# Patient Record
Sex: FEMALE | Race: WHITE | NOT HISPANIC OR LATINO
[De-identification: names, ages, dates, MRNs, and addresses within clinical notes are randomized per-mention and may not be internally consistent; named-entity substitution may affect disease eponyms.]

---

## 2021-10-04 ENCOUNTER — APPOINTMENT (OUTPATIENT)
Dept: INTERNAL MEDICINE | Facility: CLINIC | Age: 31
End: 2021-10-04
Payer: COMMERCIAL

## 2021-10-04 ENCOUNTER — APPOINTMENT (OUTPATIENT)
Dept: INTERNAL MEDICINE | Facility: CLINIC | Age: 31
End: 2021-10-04

## 2021-10-04 ENCOUNTER — TRANSCRIPTION ENCOUNTER (OUTPATIENT)
Age: 31
End: 2021-10-04

## 2021-10-04 VITALS
BODY MASS INDEX: 24.48 KG/M2 | TEMPERATURE: 97.8 F | SYSTOLIC BLOOD PRESSURE: 110 MMHG | WEIGHT: 156 LBS | DIASTOLIC BLOOD PRESSURE: 70 MMHG | HEIGHT: 67 IN

## 2021-10-04 PROBLEM — Z00.00 ENCOUNTER FOR PREVENTIVE HEALTH EXAMINATION: Status: ACTIVE | Noted: 2021-10-04

## 2021-10-04 PROCEDURE — 36415 COLL VENOUS BLD VENIPUNCTURE: CPT

## 2021-10-04 PROCEDURE — 99204 OFFICE O/P NEW MOD 45 MIN: CPT | Mod: 25

## 2021-10-04 RX ORDER — LEVOTHYROXINE SODIUM 75 UG/1
75 TABLET ORAL
Refills: 0 | Status: ACTIVE | COMMUNITY

## 2021-10-04 NOTE — ASSESSMENT
[FreeTextEntry1] : It appears patient has had 2 episodes of preseptal cellulitis based on ophthalmology evaluation.  Cannot exclude localized angioedema or allergic reaction but I am relying on ophthalmology evaluation.  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  Recurrent infections should not occur unless there is underlying sinusitis that needs to be treated\par The current time of evaluation is unremarkable\par All issues regarding patient's health and medical problems have been discussed. The patient understands and concurs with the treatment plan.\par This was an extended visit lasting 45minutes, including review of prior notes laboratory data and examination and discussing with patient including completion of current note.\par \par  [Treatment Education] : treatment education [Treatment Adherence] : treatment adherence [Rx Dose / Side Effects] : Rx dose/side effects [Risk Reduction] : risk reduction [Drug Interactions / Side Effects] : drug interactions/side effects [Disclosure of Diagnosis] : disclosure of diagnosis [Anticipatory Guidance] : anticipatory guidance

## 2021-10-04 NOTE — REVIEW OF SYSTEMS
[Eye Pain] : eye pain [Eyesight Problems] : eyesight problems [Fever] : no fever [Chills] : no chills [As Noted in HPI] : as noted in HPI [Nasal Discharge] : nasal discharge [Negative] : Neurological [FreeTextEntry3] : pressure behind right eye

## 2021-10-04 NOTE — HISTORY OF PRESENT ILLNESS
[FreeTextEntry1] : \par swelling above eye\par Patient is a 31-year-old woman who is referred here for evaluation of recurrent preseptal cellulitis of the right eye.  Patient has essentially normal health except for issues with chronic migraines.  She is 8 months postpartum without issues.  She has a past medical history of hypothyroidism on medication.  Proximally 2 weeks ago patient had an episode of swelling of her toes of her right eye with discomfort diagnosed by ophthalmology as preseptal cellulitis treated with doxycycline with good response.  Patient was off antibiotics for a week and had a typical migraine but lost peripheral vision of the right eye that lasted for hours and resolved\par Shortly after patient had a recurrent bout of preseptal cellulitis treated with doxycycline with resolution.  Patient also complains of postnasal drip and fullness retro-orbital he and is due to have a CAT scan.  She comes today for evaluation of any recurrent or residual cellulitis\par .  Of note patient is on birth control pills while having migraines with aura [de-identified] :

## 2021-10-04 NOTE — PHYSICAL EXAM
[General Appearance - Alert] : alert [General Appearance - In No Acute Distress] : in no acute distress [Sclera] : the sclera and conjunctiva were normal [Extraocular Movements] : extraocular movements were intact [FreeTextEntry1] : Examination of right eye reveals no evidence of swelling cellulitis or chronic chalazions.  Left eye examination unremarkable [Outer Ear] : the ears and nose were normal in appearance [Oropharynx] : the oropharynx was normal with no thrush [Neck Appearance] : the appearance of the neck was normal [Neck Cervical Mass (___cm)] : no neck mass was observed [Jugular Venous Distention Increased] : there was no jugular-venous distention [Thyroid Diffuse Enlargement] : the thyroid was not enlarged [Musculoskeletal - Swelling] : no joint swelling [Nail Clubbing] : no clubbing  or cyanosis of the fingernails [Motor Tone] : muscle strength and tone were normal [Skin Color & Pigmentation] : normal skin color and pigmentation [] : no rash [Cranial Nerves] : cranial nerves 2-12 were intact

## 2021-10-05 LAB — EOSINOPHIL # BLD MANUAL: 40 /UL

## 2021-10-06 LAB — IGE SER-MCNC: 3 KU/L

## 2021-10-07 LAB — ACE BLD-CCNC: 32 U/L

## 2021-10-11 ENCOUNTER — NON-APPOINTMENT (OUTPATIENT)
Age: 31
End: 2021-10-11

## 2021-11-05 ENCOUNTER — APPOINTMENT (OUTPATIENT)
Dept: INTERNAL MEDICINE | Facility: CLINIC | Age: 31
End: 2021-11-05
Payer: COMMERCIAL

## 2021-11-05 VITALS
BODY MASS INDEX: 24.72 KG/M2 | SYSTOLIC BLOOD PRESSURE: 108 MMHG | DIASTOLIC BLOOD PRESSURE: 75 MMHG | WEIGHT: 157.5 LBS | HEIGHT: 67 IN | TEMPERATURE: 97.1 F | HEART RATE: 78 BPM

## 2021-11-05 DIAGNOSIS — E78.9 DISORDER OF LIPOPROTEIN METABOLISM, UNSPECIFIED: ICD-10-CM

## 2021-11-05 PROCEDURE — G0008: CPT

## 2021-11-05 PROCEDURE — 99385 PREV VISIT NEW AGE 18-39: CPT | Mod: 25

## 2021-11-05 PROCEDURE — 90686 IIV4 VACC NO PRSV 0.5 ML IM: CPT

## 2021-11-05 PROCEDURE — 36415 COLL VENOUS BLD VENIPUNCTURE: CPT

## 2021-11-05 RX ORDER — NORETHINDRONE ACETATE AND ETHINYL ESTRADIOL AND FERROUS FUMARATE 1MG-20(24)
KIT ORAL
Refills: 0 | Status: DISCONTINUED | COMMUNITY
End: 2021-11-05

## 2021-11-05 NOTE — HISTORY OF PRESENT ILLNESS
[FreeTextEntry1] : Physical exam.  [de-identified] : Ms. MALINDA NAVARRO is a 31 year female comes to the office for physical exam. Patient denies fever, cough SOB. No other complaints at this time.

## 2021-11-05 NOTE — HEALTH RISK ASSESSMENT
[Yes] : Yes [Monthly or less (1 pt)] : Monthly or less (1 point) [1 or 2 (0 pts)] : 1 or 2 (0 points) [Never (0 pts)] : Never (0 points) [No] : In the past 12 months have you used drugs other than those required for medical reasons? No [0] : 2) Feeling down, depressed, or hopeless: Not at all (0) [PHQ-2 Negative - No further assessment needed] : PHQ-2 Negative - No further assessment needed [Patient reported PAP Smear was normal] : Patient reported PAP Smear was normal [HIV test declined] : HIV test declined [Hepatitis C test declined] : Hepatitis C test declined [] : No [de-identified] : OB/GYN- Dr. Lara  [Audit-CScore] : 1 [SCK2Zgdmt] : 0 [PapSmearDate] : 06/21

## 2021-11-05 NOTE — PLAN
[FreeTextEntry1] : In regards to patients Physical exam, routine blood work drawn, will review results with patient.\par \par Patient received flu vaccine today. Patient advised of adverse effects of medication including but not limited to muscle soreness at site of injection and general malaise \par \par Counseling included abnormal lab results, differential diagnoses, treatment options, risks and benefits, lifestyle changes, current condition, medications, and dose adjustments. \par The patient was interactive, attentive, asked questions, and verbalized understanding

## 2021-11-08 LAB
25(OH)D3 SERPL-MCNC: 30.7 NG/ML
ALBUMIN SERPL ELPH-MCNC: 4.7 G/DL
ALP BLD-CCNC: 68 U/L
ALT SERPL-CCNC: 11 U/L
ANION GAP SERPL CALC-SCNC: 13 MMOL/L
AST SERPL-CCNC: 16 U/L
BASOPHILS # BLD AUTO: 0.04 K/UL
BASOPHILS NFR BLD AUTO: 0.9 %
BILIRUB SERPL-MCNC: 0.4 MG/DL
BUN SERPL-MCNC: 10 MG/DL
CALCIUM SERPL-MCNC: 9.6 MG/DL
CHLORIDE SERPL-SCNC: 107 MMOL/L
CHOLEST SERPL-MCNC: 176 MG/DL
CO2 SERPL-SCNC: 21 MMOL/L
CREAT SERPL-MCNC: 0.62 MG/DL
EOSINOPHIL # BLD AUTO: 0.06 K/UL
EOSINOPHIL NFR BLD AUTO: 1.4 %
ESTIMATED AVERAGE GLUCOSE: 105 MG/DL
GLUCOSE SERPL-MCNC: 83 MG/DL
HBA1C MFR BLD HPLC: 5.3 %
HCT VFR BLD CALC: 46.1 %
HDLC SERPL-MCNC: 75 MG/DL
HGB BLD-MCNC: 14.6 G/DL
IMM GRANULOCYTES NFR BLD AUTO: 0.2 %
LDLC SERPL CALC-MCNC: 85 MG/DL
LYMPHOCYTES # BLD AUTO: 1.31 K/UL
LYMPHOCYTES NFR BLD AUTO: 29.9 %
MAN DIFF?: NORMAL
MCHC RBC-ENTMCNC: 30.5 PG
MCHC RBC-ENTMCNC: 31.7 GM/DL
MCV RBC AUTO: 96.4 FL
MONOCYTES # BLD AUTO: 0.38 K/UL
MONOCYTES NFR BLD AUTO: 8.7 %
NEUTROPHILS # BLD AUTO: 2.58 K/UL
NEUTROPHILS NFR BLD AUTO: 58.9 %
NONHDLC SERPL-MCNC: 100 MG/DL
PLATELET # BLD AUTO: 291 K/UL
POTASSIUM SERPL-SCNC: 4.9 MMOL/L
PROT SERPL-MCNC: 7.3 G/DL
RBC # BLD: 4.78 M/UL
RBC # FLD: 12 %
SODIUM SERPL-SCNC: 141 MMOL/L
TRIGL SERPL-MCNC: 74 MG/DL
TSH SERPL-ACNC: 1.86 UIU/ML
WBC # FLD AUTO: 4.38 K/UL

## 2022-01-19 LAB
APPEARANCE: CLEAR
BILIRUBIN URINE: NEGATIVE
BLOOD URINE: NEGATIVE
COLOR: NORMAL
GLUCOSE QUALITATIVE U: NEGATIVE
KETONES URINE: NEGATIVE
LEUKOCYTE ESTERASE URINE: NEGATIVE
NITRITE URINE: NEGATIVE
PH URINE: 5.5
PROTEIN URINE: NEGATIVE
SPECIFIC GRAVITY URINE: 1.01
UROBILINOGEN URINE: NORMAL

## 2022-02-14 ENCOUNTER — APPOINTMENT (OUTPATIENT)
Dept: PAIN MANAGEMENT | Facility: CLINIC | Age: 32
End: 2022-02-14

## 2022-02-23 ENCOUNTER — APPOINTMENT (OUTPATIENT)
Dept: GYNECOLOGIC ONCOLOGY | Facility: CLINIC | Age: 32
End: 2022-02-23
Payer: COMMERCIAL

## 2022-02-23 VITALS
HEIGHT: 67 IN | RESPIRATION RATE: 16 BRPM | DIASTOLIC BLOOD PRESSURE: 93 MMHG | WEIGHT: 157 LBS | BODY MASS INDEX: 24.64 KG/M2 | OXYGEN SATURATION: 97 % | SYSTOLIC BLOOD PRESSURE: 142 MMHG | HEART RATE: 84 BPM

## 2022-02-23 DIAGNOSIS — B97.7 PAPILLOMAVIRUS AS THE CAUSE OF DISEASES CLASSIFIED ELSEWHERE: ICD-10-CM

## 2022-02-23 PROCEDURE — 57455 BIOPSY OF CERVIX W/SCOPE: CPT | Mod: 59

## 2022-02-23 PROCEDURE — 99204 OFFICE O/P NEW MOD 45 MIN: CPT | Mod: 25

## 2022-02-28 NOTE — PROCEDURE
[Colposcopy] : colposcopy [Abnormal Pap] : an abnormal pap smear [HGSIL] : HGSIL [HPV high risk] : PCR positive for high risk HPV [Patient] : the patient [Verbal Consent] : verbal consent was obtained prior to the procedure and is detailed in the patient's record [Silver Nitrate] : silver nitrate [No Complications] : none [Tolerated Well] : the patient tolerated the procedure well [Punctation ___ o'clock] : no punctation [Mosaicism ___ o'clock] : no mosaicism [FreeTextEntry2] : mild changes noted at 7 and 11 oclock, no signs of malignancy [FreeTextEntry1] : Cervical Colposcopy performed today

## 2022-02-28 NOTE — PHYSICAL EXAM
[Normal] : Bimanual Exam: Normal [de-identified] : Patient was interviewed and examined with chaperone present. Name of Chaperone: Aminata Houser PA-C

## 2022-02-28 NOTE — END OF VISIT
[FreeTextEntry3] : Written by Ruth Houser PA-C, acting as a scribe for Dr. Juancho Baron.\par  [FreeTextEntry2] : This note accurately reflects the work and decisions made by me.\par

## 2022-02-28 NOTE — HISTORY OF PRESENT ILLNESS
[FreeTextEntry1] : 32yo  LMP 2/15/22 presents today with referral from Dr. Lara for abnormal Cervical PAP. 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 She reports never having an abnormal Cervical PAP prior to this. She has not yet undergone colposcopy. She denies smoking or HIV history and is happily  to her . She reports being fully vaccinated with Gardisal. \par \par \par \par

## 2022-02-28 NOTE — CHIEF COMPLAINT
[FreeTextEntry1] : [facility]\par \par [facility] [phone] at [address] [place] [id number]\par

## 2022-03-03 ENCOUNTER — NON-APPOINTMENT (OUTPATIENT)
Age: 32
End: 2022-03-03

## 2022-03-14 ENCOUNTER — APPOINTMENT (OUTPATIENT)
Dept: GYNECOLOGIC ONCOLOGY | Facility: CLINIC | Age: 32
End: 2022-03-14
Payer: COMMERCIAL

## 2022-03-14 LAB — CORE LAB BIOPSY: NORMAL

## 2022-03-14 PROCEDURE — 99212 OFFICE O/P EST SF 10 MIN: CPT | Mod: 95

## 2022-03-15 NOTE — REASON FOR VISIT
[Spouse] : spouse [FreeTextEntry1] : -Recently referred by Dr. Lara for abnormal cpap (HSIL, HPV HR positive). \par -Pt reported never having abnormal pap smear. \par -Reported that she is fully vaccinated with Gardasil. \par -Colposcopy performed at consultation. \par -TEB today to review results.

## 2022-03-15 NOTE — END OF VISIT
[Time Spent: ___ minutes] : I have spent [unfilled] minutes of time on the encounter. [FreeTextEntry3] : Written by Talia Dupree, acting as a scribe for Dr. Juancho Baron \par This note accurately reflects the work and decisions made by me.

## 2022-03-15 NOTE — ASSESSMENT
[FreeTextEntry1] : Discussed with pt and her  that 2/3 biopsies were normal. I explained that 1 of her biopsies revealed a low grade dysplasia which no intervention is recommended at this time. I am recommending patient follow up in my office in 6 months for a repeat pap smear. Patient stated she was relieved and agreed to comply.

## 2022-03-15 NOTE — HISTORY OF PRESENT ILLNESS
[FreeTextEntry1] : This 30 y/o was recently referred to my office by Dr. Lara for abnormal cervical pap smear from 2/10/22 revealing HSIL, HPV HR positive. Patient reported never having an abnormal pap smear and reported being fully vaccinated with Gardisil. I performed a colposcopy at the time of her consultation. Mild changes were noted at 7, and 11 o'clock, biopsies of these areas were performed as well as an ECC. She has a telehealth today to discuss results. \par \par Final Diagnosis\par \par 1. Endocervix, curettage\par - Endocervical tissue with metaplastic and reactive change\par - Superficial squamous epithelium\par \par 2. Cervix, 7 oclock, biopsy\par - Transformation zone mucosa with focal low grade squamous\par  intraepithelial lesion  (LSIL, BONY 1) in a background of reactive change\par \par 3. Cervix, 11 oclock, biopsy\par - Transformation zone mucosa with squamous intraepithelial lesion in\par  a background of reactive change\par

## 2022-07-15 ENCOUNTER — NON-APPOINTMENT (OUTPATIENT)
Age: 32
End: 2022-07-15

## 2022-08-11 ENCOUNTER — NON-APPOINTMENT (OUTPATIENT)
Age: 32
End: 2022-08-11

## 2022-09-06 ENCOUNTER — APPOINTMENT (OUTPATIENT)
Dept: OBGYN | Facility: CLINIC | Age: 32
End: 2022-09-06

## 2022-09-07 ENCOUNTER — APPOINTMENT (OUTPATIENT)
Dept: GYNECOLOGIC ONCOLOGY | Facility: CLINIC | Age: 32
End: 2022-09-07

## 2022-09-07 ENCOUNTER — LABORATORY RESULT (OUTPATIENT)
Age: 32
End: 2022-09-07

## 2022-09-07 VITALS
RESPIRATION RATE: 16 BRPM | OXYGEN SATURATION: 100 % | SYSTOLIC BLOOD PRESSURE: 129 MMHG | DIASTOLIC BLOOD PRESSURE: 83 MMHG | HEART RATE: 108 BPM | WEIGHT: 155 LBS | BODY MASS INDEX: 24.33 KG/M2 | HEIGHT: 67 IN

## 2022-09-07 PROCEDURE — 99213 OFFICE O/P EST LOW 20 MIN: CPT

## 2022-09-07 NOTE — END OF VISIT
[FreeTextEntry3] : Follow up on PAP results and as needed\par Routine gynecologic follow up [FreeTextEntry2] : Talia Dupree MA was present the entire duration of the patient interaction and gynecological exam.\par

## 2022-09-07 NOTE — HISTORY OF PRESENT ILLNESS
[FreeTextEntry1] : This 33 y/o previous patient of Dr. Juancho Baron with a hx of abnormal pap smear from 2/10/22 revealing HSIL, HPV HR positive. Patient had reported never having an abnormal pap smear and reported feeling fully vaccinated with Gardisil . Dr. Baron performed a colposcopy, mild changes were noted at 9, and 11 oclock, biopsies of these areas were performed as well as an ECC. \par \par Dr. Baron discussed with patient that 2/3 biopsies were normal and explained that 1 of her biopsies revealed low grade dysplasia which no intervention was recommended at the time. Patient was advised to follow up in 6 months for a pap smear. She reports no new gynecologic concerns. No poist-coital bleeding.

## 2022-09-07 NOTE — REASON FOR VISIT
[FreeTextEntry1] : [place] [facility] \par \par [facility] [place]. [phone]\par [address] [place] [address] \par \par follow up

## 2022-09-07 NOTE — ASSESSMENT
[FreeTextEntry1] : Pt is a 33 yo with history of LSIL PAP and biopsy showing CIN1. She presents for follow up PAP. She has no new gynecologic concerns. \par \par We discussed consideration of AHCC supplementation as it has been shown to reduce risk of HPV infection by 60% in one study. She has received the Gardacil vaccine. We will follow up on the PAP results.

## 2022-09-07 NOTE — PHYSICAL EXAM
[Chaperone Present] : A chaperone was present in the examining room during all aspects of the physical examination [FreeTextEntry1] : Talia Dupree MA was present the entire duration of the patient interaction and gynecological exam. [Normal] : Anus and perineum: Normal sphincter tone, no masses, no prolapse. [de-identified] : large ectropion, no friable tissue [de-identified] : non-tender pelvic floor muscles, uterus mobile small [Fully active, able to carry on all pre-disease performance without restriction] : Status 0 - Fully active, able to carry on all pre-disease performance without restriction

## 2022-09-14 ENCOUNTER — APPOINTMENT (OUTPATIENT)
Dept: GYNECOLOGIC ONCOLOGY | Facility: CLINIC | Age: 32
End: 2022-09-14

## 2022-09-16 LAB
CYTOLOGY CVX/VAG DOC THIN PREP: ABNORMAL
HPV HIGH+LOW RISK DNA PNL CVX: DETECTED

## 2022-09-18 ENCOUNTER — NON-APPOINTMENT (OUTPATIENT)
Age: 32
End: 2022-09-18

## 2022-09-29 ENCOUNTER — APPOINTMENT (OUTPATIENT)
Dept: GYNECOLOGIC ONCOLOGY | Facility: CLINIC | Age: 32
End: 2022-09-29

## 2022-09-29 VITALS
BODY MASS INDEX: 23.7 KG/M2 | HEART RATE: 69 BPM | SYSTOLIC BLOOD PRESSURE: 121 MMHG | DIASTOLIC BLOOD PRESSURE: 68 MMHG | HEIGHT: 67 IN | OXYGEN SATURATION: 99 % | WEIGHT: 151 LBS

## 2022-09-29 DIAGNOSIS — R85.611 ATYPICAL SQUAMOUS CELLS CANNOT EXCLUDE HIGH GRADE SQUAMOUS INTRAEPITHELIAL LESION ON CYTOLOGIC SMEAR OF ANUS (ASC-H): ICD-10-CM

## 2022-09-29 PROCEDURE — 57455 BIOPSY OF CERVIX W/SCOPE: CPT

## 2022-09-29 NOTE — ASSESSMENT
[FreeTextEntry1] : Pt is a 33 yo with ASC-H PAP, HR HPV positive (non 16,18, 45) s/p colposcopy. Cervical biopsy done at 6 and 9 o'clock.

## 2022-09-29 NOTE — REASON FOR VISIT
[FreeTextEntry1] : [place] Location \par \par [facility] [place]. [phone]\par [address] [place] [address]

## 2022-09-29 NOTE — PHYSICAL EXAM
[Chaperone Present] : A chaperone was present in the examining room during all aspects of the physical examination [FreeTextEntry1] : Talia Dupree MA was present the entire duration of the patient interaction and gynecological exam. [Abnormal] : Cervix: Abnormal [Normal] : Adnexa(ae): Normal [de-identified] : + see colposcopy [Fully active, able to carry on all pre-disease performance without restriction] : Status 0 - Fully active, able to carry on all pre-disease performance without restriction

## 2022-09-29 NOTE — END OF VISIT
[FreeTextEntry3] : Follow up on Telehealth on Biopsy results in 2 weeks [FreeTextEntry2] : Talia Dupree MA was present the entire duration of the patient interaction and gynecological exam.\par

## 2022-09-29 NOTE — PROCEDURE
[Colposcopy] : colposcopy [HPV high risk] : PCR positive for high risk HPV [Patient] : the patient [Verbal Consent] : verbal consent was obtained prior to the procedure and is detailed in the patient's record [No Abnormalities] : no abnormalities [Acetowhite ___ o'clock] : ascetowhite changes at the [unfilled] ~Uo'clock position [Atypical Vessels ___ o'clock] : no atypical vessels [Punctation ___ o'clock] : no punctation [Mosaicism ___ o'clock] : no mosaicism [Biopsies Taken: # ___] : [unfilled] biopsies taken of the cervix [ECC Done] : an endocervical curettage was not performed [Biopsy Locations ___ o'clock] : the biopsies were taken at the [unfilled] o'clock position [Silver Nitrate] : silver nitrate [No Complications] : none [Tolerated Well] : the patient tolerated the procedure well [2] : 2 [FreeTextEntry9] : ASC-H [FreeTextEntry1] : Satisfactory colposcopy with transformation zone visualized. No non-staining tissue in the canal. 6 and 9 o'clock with white acetowhite which was biopsied. 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

## 2022-10-14 ENCOUNTER — APPOINTMENT (OUTPATIENT)
Dept: GYNECOLOGIC ONCOLOGY | Facility: CLINIC | Age: 32
End: 2022-10-14

## 2022-10-14 LAB — CORE LAB BIOPSY: NORMAL

## 2022-10-14 PROCEDURE — 99442: CPT

## 2022-10-14 NOTE — HISTORY OF PRESENT ILLNESS
[Home] : at home, [unfilled] , at the time of the visit. [Other Location: e.g. Home (Enter Location, City,State)___] : at [unfilled] [Verbal consent obtained from patient] : the patient, [unfilled] [FreeTextEntry1] : Pt is a 32  with ASC-H pap who had colposcopy with biopsy and 6 o'clock showing high grade dysplasia. She presents to discuss results and further management. The patient is interested in having more kids.

## 2022-10-14 NOTE — END OF VISIT
[FreeTextEntry3] : LEEP, endocervical curettage\par CBC, pregnancy test [Time Spent: ___ minutes] : I have spent [unfilled] minutes of time on the encounter.

## 2022-10-14 NOTE — ASSESSMENT
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 Recommend LEEP excision of cervix.\par \par  I discussed at length with the patient the risks, benefits, and alternatives to LEEP conization of the cervix.   She understands the risks to include (but not be limited to): cervical stenosis and possible infertility; infection with need for hospitalization; bleeding with need for transfusion; and cervical incompetence with difficulty holding future pregnancies to term.  The patient agrees to proceed.  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

## 2022-10-17 ENCOUNTER — FORM ENCOUNTER (OUTPATIENT)
Age: 32
End: 2022-10-17

## 2022-10-30 ENCOUNTER — FORM ENCOUNTER (OUTPATIENT)
Age: 32
End: 2022-10-30

## 2022-11-02 ENCOUNTER — OUTPATIENT (OUTPATIENT)
Dept: OUTPATIENT SERVICES | Facility: HOSPITAL | Age: 32
LOS: 1 days | End: 2022-11-02
Payer: COMMERCIAL

## 2022-11-02 VITALS
HEART RATE: 80 BPM | OXYGEN SATURATION: 97 % | RESPIRATION RATE: 18 BRPM | HEIGHT: 67 IN | WEIGHT: 157.19 LBS | TEMPERATURE: 99 F | DIASTOLIC BLOOD PRESSURE: 70 MMHG | SYSTOLIC BLOOD PRESSURE: 110 MMHG

## 2022-11-02 DIAGNOSIS — Z98.890 OTHER SPECIFIED POSTPROCEDURAL STATES: Chronic | ICD-10-CM

## 2022-11-02 DIAGNOSIS — E03.9 HYPOTHYROIDISM, UNSPECIFIED: ICD-10-CM

## 2022-11-02 DIAGNOSIS — Z01.818 ENCOUNTER FOR OTHER PREPROCEDURAL EXAMINATION: ICD-10-CM

## 2022-11-02 DIAGNOSIS — Z29.9 ENCOUNTER FOR PROPHYLACTIC MEASURES, UNSPECIFIED: ICD-10-CM

## 2022-11-02 DIAGNOSIS — Z13.89 ENCOUNTER FOR SCREENING FOR OTHER DISORDER: ICD-10-CM

## 2022-11-02 DIAGNOSIS — D06.9 CARCINOMA IN SITU OF CERVIX, UNSPECIFIED: ICD-10-CM

## 2022-11-02 LAB
ANION GAP SERPL CALC-SCNC: 11 MMOL/L — SIGNIFICANT CHANGE UP (ref 5–17)
BASOPHILS # BLD AUTO: 0.08 K/UL — SIGNIFICANT CHANGE UP (ref 0–0.2)
BASOPHILS NFR BLD AUTO: 1.4 % — SIGNIFICANT CHANGE UP (ref 0–2)
BUN SERPL-MCNC: 10.3 MG/DL — SIGNIFICANT CHANGE UP (ref 8–20)
CALCIUM SERPL-MCNC: 8.9 MG/DL — SIGNIFICANT CHANGE UP (ref 8.4–10.5)
CHLORIDE SERPL-SCNC: 105 MMOL/L — SIGNIFICANT CHANGE UP (ref 96–108)
CO2 SERPL-SCNC: 23 MMOL/L — SIGNIFICANT CHANGE UP (ref 22–29)
CREAT SERPL-MCNC: 0.65 MG/DL — SIGNIFICANT CHANGE UP (ref 0.5–1.3)
EGFR: 120 ML/MIN/1.73M2 — SIGNIFICANT CHANGE UP
EOSINOPHIL # BLD AUTO: 0.64 K/UL — HIGH (ref 0–0.5)
EOSINOPHIL NFR BLD AUTO: 10.8 % — HIGH (ref 0–6)
GLUCOSE SERPL-MCNC: 92 MG/DL — SIGNIFICANT CHANGE UP (ref 70–99)
HCG SERPL-ACNC: <4 MIU/ML — SIGNIFICANT CHANGE UP
HCT VFR BLD CALC: 42.2 % — SIGNIFICANT CHANGE UP (ref 34.5–45)
HGB BLD-MCNC: 14.4 G/DL — SIGNIFICANT CHANGE UP (ref 11.5–15.5)
IMM GRANULOCYTES NFR BLD AUTO: 0.3 % — SIGNIFICANT CHANGE UP (ref 0–0.9)
LYMPHOCYTES # BLD AUTO: 1.53 K/UL — SIGNIFICANT CHANGE UP (ref 1–3.3)
LYMPHOCYTES # BLD AUTO: 25.8 % — SIGNIFICANT CHANGE UP (ref 13–44)
MCHC RBC-ENTMCNC: 31.2 PG — SIGNIFICANT CHANGE UP (ref 27–34)
MCHC RBC-ENTMCNC: 34.1 GM/DL — SIGNIFICANT CHANGE UP (ref 32–36)
MCV RBC AUTO: 91.5 FL — SIGNIFICANT CHANGE UP (ref 80–100)
MONOCYTES # BLD AUTO: 0.43 K/UL — SIGNIFICANT CHANGE UP (ref 0–0.9)
MONOCYTES NFR BLD AUTO: 7.3 % — SIGNIFICANT CHANGE UP (ref 2–14)
NEUTROPHILS # BLD AUTO: 3.22 K/UL — SIGNIFICANT CHANGE UP (ref 1.8–7.4)
NEUTROPHILS NFR BLD AUTO: 54.4 % — SIGNIFICANT CHANGE UP (ref 43–77)
PLATELET # BLD AUTO: 266 K/UL — SIGNIFICANT CHANGE UP (ref 150–400)
POTASSIUM SERPL-MCNC: 4.5 MMOL/L — SIGNIFICANT CHANGE UP (ref 3.5–5.3)
POTASSIUM SERPL-SCNC: 4.5 MMOL/L — SIGNIFICANT CHANGE UP (ref 3.5–5.3)
RBC # BLD: 4.61 M/UL — SIGNIFICANT CHANGE UP (ref 3.8–5.2)
RBC # FLD: 12.4 % — SIGNIFICANT CHANGE UP (ref 10.3–14.5)
SODIUM SERPL-SCNC: 139 MMOL/L — SIGNIFICANT CHANGE UP (ref 135–145)
T3 SERPL-MCNC: 109 NG/DL — SIGNIFICANT CHANGE UP (ref 80–200)
T4 AB SER-ACNC: 8.7 UG/DL — SIGNIFICANT CHANGE UP (ref 4.5–12)
TSH SERPL-MCNC: 1.71 UIU/ML — SIGNIFICANT CHANGE UP (ref 0.27–4.2)
WBC # BLD: 5.92 K/UL — SIGNIFICANT CHANGE UP (ref 3.8–10.5)
WBC # FLD AUTO: 5.92 K/UL — SIGNIFICANT CHANGE UP (ref 3.8–10.5)

## 2022-11-02 PROCEDURE — 84443 ASSAY THYROID STIM HORMONE: CPT

## 2022-11-02 PROCEDURE — 85025 COMPLETE CBC W/AUTO DIFF WBC: CPT

## 2022-11-02 PROCEDURE — 36415 COLL VENOUS BLD VENIPUNCTURE: CPT

## 2022-11-02 PROCEDURE — G0463: CPT

## 2022-11-02 PROCEDURE — 80048 BASIC METABOLIC PNL TOTAL CA: CPT

## 2022-11-02 PROCEDURE — 84480 ASSAY TRIIODOTHYRONINE (T3): CPT

## 2022-11-02 PROCEDURE — 84436 ASSAY OF TOTAL THYROXINE: CPT

## 2022-11-02 PROCEDURE — 84702 CHORIONIC GONADOTROPIN TEST: CPT

## 2022-11-02 NOTE — H&P PST ADULT - NEUROLOGICAL
details… normal/sensation intact/responds to verbal commands/cranial nerves intact/no spontaneous movement

## 2022-11-02 NOTE — H&P PST ADULT - PROBLEM SELECTOR PLAN 2
Thyroid panel performed.  Patient instructed to take Synthroid with a sip of water morning of surgery, verbalized understanding.

## 2022-11-02 NOTE — H&P PST ADULT - PROBLEM SELECTOR PLAN 1
Labs performed.  Scheduled for conization of cervix, loop electrosurgical, endocervical curettage with Dr. Garcia on 11/15/2022.   Written and verbal instructions provided.  Patient educated on  COVID testing, preadmission instructions, ERP protocol and day of procedure medications, verbalizes understanding, teach back method utilized.  Patient instructed to stop ASA/Herbals and anti-inflammatory meds one week prior to surgery.

## 2022-11-02 NOTE — H&P PST ADULT - HISTORY OF PRESENT ILLNESS
32 year old female ,  LMP 10/10/2022 with PMH of hypothyroidism  presents today for PST c/o abnormal PAP. Patient states 2/10/22  she had abnormal Cervical PAP. 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 She returns to the office today for a colposcopy.  colposcopy with biopsy and 6 o'clock showing high grade dysplasia  1 episode of post-coital bleeding end of 2022  denies    32 year old female ,  LMP 10/10/2022 with PMH of hypothyroidism and migranes presents today for PST c/o abnormal PAP. Patient states 2/10/22  she had abnormal Cervical PAP. 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 States a repeat colposcopy 3/2022 was also abnormal, she was advised to follow up in 6 months. Repeat PAP on 2022 revealed HPV, biopsy performed with ASC-H . Colposcopy with biopsy performed on 2022 revealed   6 o' clock showing high grade dysplasia. Denies dyspareunia, spotting, irregular bleeding or pelvic pain. Reports 1 episode of post-coital bleeding end of 2022. She is now scheduled for conization of cervix, loop electrosurgical, endocervical curettage with Dr. Garcia on 11/15/2022.

## 2022-11-02 NOTE — H&P PST ADULT - PROBLEM SELECTOR PLAN 3
CAP score 2 patient Low Risk,  SCDs ordered for day of procedure.  Surgical team to assess need for VTE Prophylaxis

## 2022-11-02 NOTE — H&P PST ADULT - NEGATIVE ENMT SYMPTOMS
no hearing difficulty/no ear pain/no tinnitus/no nasal congestion/no post-nasal discharge/no nose bleeds/no dry mouth/no throat pain/no dysphagia

## 2022-11-02 NOTE — H&P PST ADULT - ASSESSMENT
This is a pleasant 32 year old female ,  LMP 10/10/2022 in NAD with PMH of hypothyroidism and migraines present today for PST c/o abnormal PAP. Patient with history of abnormal PAP 2/10/22- HSIL, HPBV+, 3/2022 and 2022- HPV. 22 colposcopy with biopsy revealed 6 o'clock showing high grade dysplasia.  She is now scheduled for conization of cervix, loop electrosurgical, endocervical curettage with Dr. Garcia on 11/15/2022.     CAPRINI SCORE    AGE RELATED RISK FACTORS                                                             [ ] Age 41-60 years                                            (1 Point)  [ ] Age: 61-74 years                                           (2 Points)                 [ ] Age= 75 years                                                (3 Points)             DISEASE RELATED RISK FACTORS                                                       [ ] Edema in the lower extremities                 (1 Point)                     [ ] Varicose veins                                               (1 Point)                                 [ ] BMI > 25 Kg/m2                                            (1 Point)                                  [ ] Serious infection (ie PNA)                            (1 Point)                     [ ] Lung disease ( COPD, Emphysema)            (1 Point)                                                                          [ ] Acute myocardial infarction                         (1 Point)                  [ ] Congestive heart failure (in the previous month)  (1 Point)         [ ] Inflammatory bowel disease                            (1 Point)                  [ ] Central venous access, PICC or Port               (2 points)       (within the last month)                                                                [ ] Stroke (in the previous month)                        (5 Points)    [ ] Previous or present malignancy                       (2 points)                                                                                                                                                         HEMATOLOGY RELATED FACTORS                                                         [ ] Prior episodes of VTE                                     (3 Points)                     [ ] Positive family history for VTE                      (3 Points)                  [ ] Prothrombin 24595 A                                     (3 Points)                     [ ] Factor V Leiden                                                (3 Points)                        [ ] Lupus anticoagulants                                      (3 Points)                                                           [ ] Anticardiolipin antibodies                              (3 Points)                                                       [ ] High homocysteine in the blood                   (3 Points)                                             [ ] Other congenital or acquired thrombophilia      (3 Points)                                                [ ] Heparin induced thrombocytopenia                  (3 Points)                                        MOBILITY RELATED FACTORS  [ ] Bed rest                                                         (1 Point)  [ ] Plaster cast                                                    (2 points)  [ ] Bed bound for more than 72 hours           (2 Points)    GENDER SPECIFIC FACTORS  [ ] Pregnancy or had a baby within the last month   (1 Point)  [ ] Post-partum < 6 weeks                                   (1 Point)  [ ] Hormonal therapy  or oral contraception   (1 Point)  [ ] History of pregnancy complications              (1 point)  [ ] Unexplained or recurrent              (1 Point)    OTHER RISK FACTORS                                           (1 Point)  [ ] BMI >40, smoking, diabetes requiring insulin, chemotherapy  blood transfusions and length of surgery over 2 hours    SURGERY RELATED RISK FACTORS  [ ]  Section within the last month     (1 Point)  [ ] Minor surgery                                                  (1 Point)  [ ] Arthroscopic surgery                                       (2 Points)  [X ] Planned major surgery lasting more            (2 Points)      than 45 minutes     [ ] Elective hip or knee joint replacement       (5 points)       surgery                                                TRAUMA RELATED RISK FACTORS  [ ] Fracture of the hip, pelvis, or leg                       (5 Points)  [ ] Spinal cord injury resulting in paralysis             (5 points)       (in the previous month)    [ ] Paralysis  (less than 1 month)                             (5 Points)  [ ] Multiple Trauma within 1 month                        (5 Points)    Total Score [   2     ]    Caprini Score 0-2: Low Risk, NO VTE prophylaxis required for most patients, encourage ambulation  Caprini Score 3-6: Moderate Risk , pharmacologic VTE prophylaxis is indicated for most patients (in the absence of contraindications)  Caprini Score Greater than or =7: High risk, pharmocologic VTE prophylaxis indicated for most patients (in the absence of contraindications)    OPIOID RISK TOOL    CAITIE EACH BOX THAT APPLIES AND ADD TOTALS AT THE END    FAMILY HISTORY OF SUBSTANCE ABUSE                 FEMALE         MALE                                                Alcohol                             [  ]1 pt          [  ]3pts                                               Illegal Durgs                     [  ]2 pts        [  ]3pts                                               Rx Drugs                           [  ]4 pts        [  ]4 pts    PERSONAL HISTORY OF SUBSTANCE ABUSE                                                                                          Alcohol                             [  ]3 pts       [  ]3 pts                                               Illegal Drugs                     [  ]4 pts        [  ]4 pts                                               Rx Drugs                           [  ]5 pts        [  ]5 pts    AGE BETWEEN 16-45 YEARS                                      [ X ]1 pt         [  ]1 pt    HISTORY OF PREADOLESCENT   SEXUAL ABUSE                                                             [  ]3 pts        [  ]0pts    PSYCHOLOGICAL DISEASE                     ADD, OCD, Bipolar, Schizophrenia        [  ]2 pts         [  ]2 pts                      Depression                                               [  ]1 pt           [  ]1 pt           SCORING TOTAL   (add numbers and type here)              (**1*)                                     A score of 3 or lower indicated LOW risk for future opioid abuse  A score of 4 to 7 indicated moderate risk for future opioid abuse  A score of 8 or higher indicates a high risk for opioid abuse

## 2022-11-02 NOTE — H&P PST ADULT - NSICDXFAMILYHX_GEN_ALL_CORE_FT
FAMILY HISTORY:  FH: kidney disease, matrnal grandfather  FH: non-Hodgkin's lymphoma, maternal grandmother    Father  Still living? Unknown  FH: asthma, Age at diagnosis: Age Unknown    Mother  Still living? Unknown  FH: hypothyroidism, Age at diagnosis: Age Unknown

## 2022-11-15 ENCOUNTER — OUTPATIENT (OUTPATIENT)
Dept: OUTPATIENT SERVICES | Facility: HOSPITAL | Age: 32
LOS: 1 days | End: 2022-11-15
Payer: COMMERCIAL

## 2022-11-15 ENCOUNTER — RESULT REVIEW (OUTPATIENT)
Age: 32
End: 2022-11-15

## 2022-11-15 ENCOUNTER — TRANSCRIPTION ENCOUNTER (OUTPATIENT)
Age: 32
End: 2022-11-15

## 2022-11-15 VITALS
HEART RATE: 104 BPM | TEMPERATURE: 98 F | HEIGHT: 67 IN | SYSTOLIC BLOOD PRESSURE: 120 MMHG | OXYGEN SATURATION: 99 % | WEIGHT: 156.97 LBS | DIASTOLIC BLOOD PRESSURE: 86 MMHG | RESPIRATION RATE: 16 BRPM

## 2022-11-15 VITALS
HEART RATE: 66 BPM | OXYGEN SATURATION: 100 % | SYSTOLIC BLOOD PRESSURE: 113 MMHG | RESPIRATION RATE: 16 BRPM | DIASTOLIC BLOOD PRESSURE: 71 MMHG

## 2022-11-15 DIAGNOSIS — Z98.890 OTHER SPECIFIED POSTPROCEDURAL STATES: Chronic | ICD-10-CM

## 2022-11-15 DIAGNOSIS — D06.9 CARCINOMA IN SITU OF CERVIX, UNSPECIFIED: ICD-10-CM

## 2022-11-15 DIAGNOSIS — Z01.818 ENCOUNTER FOR OTHER PREPROCEDURAL EXAMINATION: ICD-10-CM

## 2022-11-15 PROCEDURE — 88307 TISSUE EXAM BY PATHOLOGIST: CPT | Mod: 26

## 2022-11-15 PROCEDURE — 57522 CONIZATION OF CERVIX: CPT

## 2022-11-15 PROCEDURE — 88305 TISSUE EXAM BY PATHOLOGIST: CPT | Mod: 26

## 2022-11-15 PROCEDURE — 88307 TISSUE EXAM BY PATHOLOGIST: CPT

## 2022-11-15 PROCEDURE — 88305 TISSUE EXAM BY PATHOLOGIST: CPT

## 2022-11-15 DEVICE — SURGICEL SNOW 2 X 4": Type: IMPLANTABLE DEVICE | Status: FUNCTIONAL

## 2022-11-15 RX ORDER — ACETAMINOPHEN 500 MG
975 TABLET ORAL ONCE
Refills: 0 | Status: COMPLETED | OUTPATIENT
Start: 2022-11-15 | End: 2022-11-15

## 2022-11-15 RX ORDER — LEVOTHYROXINE SODIUM 125 MCG
1 TABLET ORAL
Qty: 0 | Refills: 0 | DISCHARGE

## 2022-11-15 RX ORDER — SODIUM CHLORIDE 9 MG/ML
3 INJECTION INTRAMUSCULAR; INTRAVENOUS; SUBCUTANEOUS ONCE
Refills: 0 | Status: COMPLETED | OUTPATIENT
Start: 2022-11-15 | End: 2022-11-15

## 2022-11-15 RX ADMIN — Medication 975 MILLIGRAM(S): at 10:49

## 2022-11-15 RX ADMIN — SODIUM CHLORIDE 3 MILLILITER(S): 9 INJECTION INTRAMUSCULAR; INTRAVENOUS; SUBCUTANEOUS at 11:02

## 2022-11-15 NOTE — BRIEF OPERATIVE NOTE - OPERATION/FINDINGS
Normal appearing external genitalia. Multiparous cervix with no masses or lesions to visible eye. After Lugols applied, no  of stain at 11-12oclock area as well as 5oclock area where previous biopsy taken.   On bimanual exam, uterus is small and mobile. no adnexal masses.

## 2022-11-15 NOTE — ASU DISCHARGE PLAN (ADULT/PEDIATRIC) - ASU DC SPECIAL INSTRUCTIONSFT
Follow-up with Dr. Garcia in two weeks to review pathology report.    May walk and climb stairs as often as youd like, no vigorous activity, do not lift anything greater than 10lbs, nothing per vagina x 4 weeks.     Please contact your provider for any pain uncontrolled by medication, excessive bleeding or Fever>100.4  Please take aleve-1 tablet every 12 hours x 3 days.

## 2022-11-15 NOTE — BRIEF OPERATIVE NOTE - NSICDXBRIEFPREOP_GEN_ALL_CORE_FT
PRE-OP DIAGNOSIS:  High grade squamous intraepithelial cervical dysplasia 15-Nov-2022 13:22:04  Madelyn Johnston

## 2022-11-15 NOTE — BRIEF OPERATIVE NOTE - NSICDXBRIEFPROCEDURE_GEN_ALL_CORE_FT
PROCEDURES:  LEEP, cervix 15-Nov-2022 13:21:37  Madelyn Johnston  Endocervical curettage 15-Nov-2022 13:21:47  Madelyn Johnston

## 2022-11-15 NOTE — ASU DISCHARGE PLAN (ADULT/PEDIATRIC) - NS MD DC FALL RISK RISK
For information on Fall & Injury Prevention, visit: [email or web address] OR  [email or web address] OR  https://www.cdc.gov/steadi/patient.html

## 2022-11-15 NOTE — ASU DISCHARGE PLAN (ADULT/PEDIATRIC) - CARE PROVIDER_API CALL
Kurtis Garcia)  [facility]  [address] [place] [address]  Phone: [phone]  Fax: [phone]  Follow Up Time:

## 2022-11-15 NOTE — BRIEF OPERATIVE NOTE - NSICDXBRIEFPOSTOP_GEN_ALL_CORE_FT
POST-OP DIAGNOSIS:  High grade squamous intraepithelial cervical dysplasia 15-Nov-2022 13:22:17  Madelyn Johnston

## 2022-11-16 PROBLEM — G43.109 MIGRAINE WITH AURA, NOT INTRACTABLE, WITHOUT STATUS MIGRAINOSUS: Chronic | Status: ACTIVE | Noted: 2022-11-02

## 2022-11-16 PROBLEM — J45.909 UNSPECIFIED ASTHMA, UNCOMPLICATED: Chronic | Status: ACTIVE | Noted: 2022-11-02

## 2022-11-16 PROBLEM — E03.9 HYPOTHYROIDISM, UNSPECIFIED: Chronic | Status: ACTIVE | Noted: 2022-11-02

## 2022-11-21 LAB — SURGICAL PATHOLOGY STUDY: SIGNIFICANT CHANGE UP

## 2022-11-28 ENCOUNTER — NON-APPOINTMENT (OUTPATIENT)
Age: 32
End: 2022-11-28

## 2022-11-30 ENCOUNTER — NON-APPOINTMENT (OUTPATIENT)
Age: 32
End: 2022-11-30

## 2022-12-07 ENCOUNTER — APPOINTMENT (OUTPATIENT)
Dept: GYNECOLOGIC ONCOLOGY | Facility: CLINIC | Age: 32
End: 2022-12-07

## 2022-12-07 PROCEDURE — 99024 POSTOP FOLLOW-UP VISIT: CPT

## 2022-12-07 NOTE — ASSESSMENT
[FreeTextEntry1] : 33 yo pt  is status post LEEP procedure. 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 negative ECC. Recommend PAP in 6 months and if negative continue routine care. She reports taking the [facility] supplementation.

## 2022-12-07 NOTE — DISCUSSION/SUMMARY
[Findings] : These findings were discussed with [unfilled] in detail. She understood and accepted the rationale for this recommendation and also understood the serious impact that these findings could have upon her prognosis for survival. [Doing Well] : is doing well [Excellent Pain Control] : has excellent pain control [No Sign of Infection] : is showing no signs of infection [FreeTextEntry1] : - Pertinent Findings:  4 cm cervix with large cervical ectropion. Lugol’s non-staining tissue removed to a depth of 3 mm in 4 passes. Loop excision of tissue around cervical canal for top hat. No concern for malignancy.\par \par Final Diagnosis\par 1. Endocervix , curetting\par - Endocervical fragments\par - Negative for dysplasia\par 2. Cervix,  anterior , LEEP excision\par - Chronic cervicitis and hyperkeratosis\par - Negative for dysplasia\par 3. Cervix,  6:00 , LEEP excision\par - Moderate dysplasia ( CIN2, HGSIL)\par - Margins appear negative for dysplasia\par 4. Cervix,  top hat  , LEEP excision\par - Cauterized endocervix\par - Chronic cervicitis\par - Negative for dysplasia\par 5. Cervix,  middle , LEEP excision\par - Chronic cervcitis\par - Negative for dysplasia\par

## 2022-12-07 NOTE — END OF VISIT
[FreeTextEntry3] : follow up with primary gynecologist for PAP in 6 months\par Follow up as needed [FreeTextEntry2] : Talia Dupree MA was present the entire duration of the patient interaction and gynecological exam.\par

## 2022-12-07 NOTE — REASON FOR VISIT
[Post Op] : post op visit [de-identified] : 11/15/2022 [de-identified] : LEEP for High grade cervical dysplasia at [facility].  [de-identified] : Denies any SOB, abnormal pain or VB. Bowel and bladder function are wnl. Patient states she feels well from a gynecological stand point. Reports no issues post-operatively.

## 2023-01-23 ENCOUNTER — OFFICE (OUTPATIENT)
Dept: URBAN - METROPOLITAN AREA CLINIC 104 | Facility: CLINIC | Age: 33
Setting detail: OPHTHALMOLOGY
End: 2023-01-23
Payer: COMMERCIAL

## 2023-01-23 DIAGNOSIS — H35.372: ICD-10-CM

## 2023-01-23 PROCEDURE — 99213 OFFICE O/P EST LOW 20 MIN: CPT | Performed by: OPHTHALMOLOGY

## 2023-01-23 PROCEDURE — 92134 CPTRZ OPH DX IMG PST SGM RTA: CPT | Performed by: OPHTHALMOLOGY

## 2023-01-23 ASSESSMENT — REFRACTION_AUTOREFRACTION
OD_CYLINDER: -1.50
OS_CYLINDER: -2.00
OD_AXIS: 002
OS_SPHERE: -3.25
OS_AXIS: 179
OD_SPHERE: -3.50

## 2023-01-23 ASSESSMENT — KERATOMETRY
OD_K1POWER_DIOPTERS: 42.56
OD_AXISANGLE_DEGREES: 093
OS_AXISANGLE_DEGREES: 088
OS_K1POWER_DIOPTERS: 42.94
OS_K2POWER_DIOPTERS: 45.00
OD_K2POWER_DIOPTERS: 44.58

## 2023-01-23 ASSESSMENT — AXIALLENGTH_DERIVED
OS_AL: 25.1746
OD_AL: 25.34

## 2023-01-23 ASSESSMENT — CONFRONTATIONAL VISUAL FIELD TEST (CVF)
OD_FINDINGS: FULL
OS_FINDINGS: FULL

## 2023-01-23 ASSESSMENT — SPHEQUIV_DERIVED
OD_SPHEQUIV: -4.25
OS_SPHEQUIV: -4.25

## 2023-01-23 ASSESSMENT — REFRACTION_CURRENTRX
OD_AXIS: 005
OD_OVR_VA: 20/
OS_OVR_VA: 20/
OS_SPHERE: -4.25
OD_SPHERE: -4.00
OD_CYLINDER: -0.75
OS_CYLINDER: -0.75
OS_AXIS: 178

## 2023-01-23 ASSESSMENT — VISUAL ACUITY
OS_BCVA: 20/20
OD_BCVA: 20/20-2

## 2023-01-23 ASSESSMENT — LID EXAM ASSESSMENTS: OD_EDEMA: ABSENT

## 2023-02-19 ENCOUNTER — NON-APPOINTMENT (OUTPATIENT)
Age: 33
End: 2023-02-19

## 2023-02-20 NOTE — BRIEF OPERATIVE NOTE - ELECTIVE PROCEDURE
Detail Level: Detailed Depth Of Biopsy: dermis Was A Bandage Applied: Yes Size Of Lesion In Cm: 0 Biopsy Type: H and E Biopsy Method: Dermablade Anesthesia Type: 1% lidocaine with epinephrine Anesthesia Volume In Cc: 0.5 Hemostasis: Drysol No Wound Care: Petrolatum Dressing: bandage Destruction After The Procedure: No Type Of Destruction Used: Curettage Curettage Text: The wound bed was treated with curettage after the biopsy was performed. Cryotherapy Text: The wound bed was treated with cryotherapy after the biopsy was performed. Electrodesiccation Text: The wound bed was treated with electrodesiccation after the biopsy was performed. Electrodesiccation And Curettage Text: The wound bed was treated with electrodesiccation and curettage after the biopsy was performed. Silver Nitrate Text: The wound bed was treated with silver nitrate after the biopsy was performed. Lab: [id number] Lab Facility: [id number] Consent: Written consent was obtained and risks were reviewed including but not limited to scarring, infection, bleeding, scabbing, incomplete removal, nerve damage and allergy to anesthesia. Post-Care Instructions: I reviewed with the patient in detail post-care instructions. Patient is to keep the biopsy site dry overnight, and then apply bacitracin twice daily until healed. Patient may apply hydrogen peroxide soaks to remove any crusting. Notification Instructions: Patient will be notified of biopsy results. However, patient instructed to call the office if not contacted within 2 weeks. Billing Type: Third-Party Bill Information: Selecting Yes will display possible errors in your note based on the variables you have selected. This validation is only offered as a suggestion for you. PLEASE NOTE THAT THE VALIDATION TEXT WILL BE REMOVED WHEN YOU FINALIZE YOUR NOTE. IF YOU WANT TO FAX A PRELIMINARY NOTE YOU WILL NEED TO TOGGLE THIS TO 'NO' IF YOU DO NOT WANT IT IN YOUR FAXED NOTE.

## 2023-04-25 ENCOUNTER — TRANSCRIPTION ENCOUNTER (OUTPATIENT)
Age: 33
End: 2023-04-25

## 2023-04-25 ENCOUNTER — APPOINTMENT (OUTPATIENT)
Dept: INTERNAL MEDICINE | Facility: CLINIC | Age: 33
End: 2023-04-25
Payer: COMMERCIAL

## 2023-04-25 VITALS
BODY MASS INDEX: 24.64 KG/M2 | WEIGHT: 157 LBS | HEIGHT: 67 IN | OXYGEN SATURATION: 99 % | HEART RATE: 74 BPM | SYSTOLIC BLOOD PRESSURE: 101 MMHG | DIASTOLIC BLOOD PRESSURE: 70 MMHG

## 2023-04-25 DIAGNOSIS — Z82.49 FAMILY HISTORY OF ISCHEMIC HEART DISEASE AND OTHER DISEASES OF THE CIRCULATORY SYSTEM: ICD-10-CM

## 2023-04-25 DIAGNOSIS — L03.213 PERIORBITAL CELLULITIS: ICD-10-CM

## 2023-04-25 DIAGNOSIS — E55.9 VITAMIN D DEFICIENCY, UNSPECIFIED: ICD-10-CM

## 2023-04-25 DIAGNOSIS — Z78.9 OTHER SPECIFIED HEALTH STATUS: ICD-10-CM

## 2023-04-25 DIAGNOSIS — Z84.1 FAMILY HISTORY OF DISORDERS OF KIDNEY AND URETER: ICD-10-CM

## 2023-04-25 DIAGNOSIS — R53.83 OTHER FATIGUE: ICD-10-CM

## 2023-04-25 DIAGNOSIS — R09.89 OTHER SPECIFIED SYMPTOMS AND SIGNS INVOLVING THE CIRCULATORY AND RESPIRATORY SYSTEMS: ICD-10-CM

## 2023-04-25 DIAGNOSIS — R51.9 HEADACHE, UNSPECIFIED: ICD-10-CM

## 2023-04-25 DIAGNOSIS — E03.9 HYPOTHYROIDISM, UNSPECIFIED: ICD-10-CM

## 2023-04-25 DIAGNOSIS — Z86.39 PERSONAL HISTORY OF OTHER ENDOCRINE, NUTRITIONAL AND METABOLIC DISEASE: ICD-10-CM

## 2023-04-25 DIAGNOSIS — Z83.49 FAMILY HISTORY OF OTHER ENDOCRINE, NUTRITIONAL AND METABOLIC DISEASES: ICD-10-CM

## 2023-04-25 DIAGNOSIS — R68.82 DECREASED LIBIDO: ICD-10-CM

## 2023-04-25 DIAGNOSIS — G43.909 MIGRAINE, UNSPECIFIED, NOT INTRACTABLE, W/OUT STATUS MIGRAINOSUS: ICD-10-CM

## 2023-04-25 DIAGNOSIS — Z00.00 ENCOUNTER FOR GENERAL ADULT MEDICAL EXAMINATION W/OUT ABNORMAL FINDINGS: ICD-10-CM

## 2023-04-25 DIAGNOSIS — Z23 ENCOUNTER FOR IMMUNIZATION: ICD-10-CM

## 2023-04-25 DIAGNOSIS — Z92.89 PERSONAL HISTORY OF OTHER MEDICAL TREATMENT: ICD-10-CM

## 2023-04-25 PROCEDURE — 99203 OFFICE O/P NEW LOW 30 MIN: CPT | Mod: 25

## 2023-04-25 PROCEDURE — G0444 DEPRESSION SCREEN ANNUAL: CPT | Mod: 59

## 2023-04-25 PROCEDURE — 93000 ELECTROCARDIOGRAM COMPLETE: CPT | Mod: 59

## 2023-04-25 PROCEDURE — 99385 PREV VISIT NEW AGE 18-39: CPT | Mod: 25

## 2023-04-25 PROCEDURE — 36415 COLL VENOUS BLD VENIPUNCTURE: CPT

## 2023-04-25 RX ORDER — DOXYCYCLINE HYCLATE 100 MG/1
100 TABLET ORAL
Refills: 0 | Status: DISCONTINUED | COMMUNITY
End: 2023-04-25

## 2023-04-25 NOTE — ASSESSMENT
[FreeTextEntry1] : CPE\par -CBC, CMP, A1c, lipid, UA\par EKG is sinus rhythm, low voltage.  Asymptomatic\par Follows with Gyn Onc for Pap smear\par \par 1.  Fatigue, low libido, brain\par Thyroid function test, vitamin D, B12, pituitary work-up given mom's history with LH, FSH, prolactin\par \par 2.  Easy bruising\par CBC, coag\par \par 3.  Hypothyroidism\par TSH and T4\par \par 4.  Anxiety\par Baseline PHQ and CHAVA-7 in chart\par Follows with therapist\par We will consider pharmacotherapy

## 2023-04-25 NOTE — HEALTH RISK ASSESSMENT
[Yes] : Yes [0] : 2) Feeling down, depressed, or hopeless: Not at all (0) [PHQ-2 Negative - No further assessment needed] : PHQ-2 Negative - No further assessment needed [de-identified] : Rarely [[id number]] : 0 [Patient reported PAP Smear was abnormal] : Patient reported PAP Smear was abnormal [PapSmearComments] : Abnormal pap s/p LEEP. Followed  by GynOnc [Never] : Never

## 2023-04-25 NOTE — HISTORY OF PRESENT ILLNESS
[FreeTextEntry1] : CPE [de-identified] : Amita presents today for CPE.  She is here today as a new patient.  She has a history of intermittent migraine, hypothyroidism, and abnormal Pap that is being followed by GynOnc.  She is overdue for CPE.  He also notes a history of anxiety that really became exacerbated and more notable and her postpartum period.  She has been doing talk therapy which is helped dramatically.  Upon retrospect, she felt that she always had maybe mild anxiety, though always had adequate coping tendencies.  In the postpartum period, anxiety and intrusive thoughts increased dramatically.  She was never on medication.  She is seeing we will try to conceive in the near future.  Also reports some fatigue, sense of brain fog, and low libido.  She wonders if her hormone levels are slightly off.  Periods remain regular and are not excessively heavy or different than prior.  She was on an OCP from around age 14 to around age 30.  She was on an OCP in the postpartum period but developed migraine and had an aura for the first time.  At that time, the OCP was discontinued.  Her mom was recently diagnosed with pituitary adenoma.

## 2023-04-25 NOTE — REVIEW OF SYSTEMS
[Fever] : no fever [Chills] : no chills [Fatigue] : no fatigue [Night Sweats] : no night sweats [Vision Problems] : no vision problems [Nasal Discharge] : no nasal discharge [Sore Throat] : no sore throat [Chest Pain] : no chest pain [Palpitations] : no palpitations [Shortness Of Breath] : no shortness of breath [Wheezing] : no wheezing [Cough] : no cough [Abdominal Pain] : no abdominal pain [Nausea] : no nausea [Constipation] : no constipation [Diarrhea] : diarrhea [Vomiting] : no vomiting [Dysuria] : no dysuria [Poor Libido] : poor libido [Hematuria] : no hematuria [Joint Pain] : no joint pain [Muscle Pain] : no muscle pain [Headache] : no headache [Dizziness] : no dizziness [Anxiety] : anxiety [Easy Bruising] : easy bruising

## 2023-05-03 ENCOUNTER — TRANSCRIPTION ENCOUNTER (OUTPATIENT)
Age: 33
End: 2023-05-03

## 2023-05-04 ENCOUNTER — TRANSCRIPTION ENCOUNTER (OUTPATIENT)
Age: 33
End: 2023-05-04

## 2023-05-05 ENCOUNTER — APPOINTMENT (OUTPATIENT)
Dept: CARDIOLOGY | Facility: CLINIC | Age: 33
End: 2023-05-05
Payer: COMMERCIAL

## 2023-05-05 ENCOUNTER — NON-APPOINTMENT (OUTPATIENT)
Age: 33
End: 2023-05-05

## 2023-05-05 ENCOUNTER — TRANSCRIPTION ENCOUNTER (OUTPATIENT)
Age: 33
End: 2023-05-05

## 2023-05-05 VITALS
TEMPERATURE: 98.1 F | DIASTOLIC BLOOD PRESSURE: 70 MMHG | BODY MASS INDEX: 24.64 KG/M2 | WEIGHT: 157 LBS | HEART RATE: 64 BPM | OXYGEN SATURATION: 99 % | SYSTOLIC BLOOD PRESSURE: 106 MMHG | HEIGHT: 67 IN

## 2023-05-05 DIAGNOSIS — I47.29 OTHER VENTRICULAR TACHYCARDIA: ICD-10-CM

## 2023-05-05 DIAGNOSIS — R00.2 PALPITATIONS: ICD-10-CM

## 2023-05-05 DIAGNOSIS — F41.9 ANXIETY DISORDER, UNSPECIFIED: ICD-10-CM

## 2023-05-05 LAB
25(OH)D3 SERPL-MCNC: 19.6 NG/ML
ALBUMIN SERPL ELPH-MCNC: 4.4 G/DL
ALP BLD-CCNC: 46 U/L
ALT SERPL-CCNC: 9 U/L
ANION GAP SERPL CALC-SCNC: 12 MMOL/L
APPEARANCE: CLEAR
AST SERPL-CCNC: 14 U/L
BACTERIA: NEGATIVE /HPF
BASOPHILS # BLD AUTO: 0.04 K/UL
BASOPHILS NFR BLD AUTO: 0.8 %
BILIRUB SERPL-MCNC: 0.5 MG/DL
BILIRUBIN URINE: NEGATIVE
BLOOD URINE: NEGATIVE
BUN SERPL-MCNC: 8 MG/DL
CALCIUM SERPL-MCNC: 9.2 MG/DL
CAST: 0 /LPF
CHLORIDE SERPL-SCNC: 105 MMOL/L
CHOLEST SERPL-MCNC: 165 MG/DL
CO2 SERPL-SCNC: 21 MMOL/L
COLOR: YELLOW
CREAT SERPL-MCNC: 0.56 MG/DL
EGFR: 124 ML/MIN/1.73M2
EOSINOPHIL # BLD AUTO: 0.1 K/UL
EOSINOPHIL NFR BLD AUTO: 1.9 %
EPITHELIAL CELLS: 1 /HPF
ESTIMATED AVERAGE GLUCOSE: 105 MG/DL
FOLATE SERPL-MCNC: 10.6 NG/ML
FSH SERPL-MCNC: 4.7 IU/L
GLUCOSE QUALITATIVE U: NEGATIVE MG/DL
GLUCOSE SERPL-MCNC: 84 MG/DL
HBA1C MFR BLD HPLC: 5.3 %
HCT VFR BLD CALC: 41 %
HDLC SERPL-MCNC: 75 MG/DL
HGB BLD-MCNC: 13.4 G/DL
IMM GRANULOCYTES NFR BLD AUTO: 0.2 %
KETONES URINE: NEGATIVE MG/DL
LDLC SERPL CALC-MCNC: 78 MG/DL
LEUKOCYTE ESTERASE URINE: NEGATIVE
LH SERPL-ACNC: 9.4 IU/L
LYMPHOCYTES # BLD AUTO: 1.12 K/UL
LYMPHOCYTES NFR BLD AUTO: 21.5 %
MAN DIFF?: NORMAL
MCHC RBC-ENTMCNC: 30.5 PG
MCHC RBC-ENTMCNC: 32.7 GM/DL
MCV RBC AUTO: 93.2 FL
MICROSCOPIC-UA: NORMAL
MONOCYTES # BLD AUTO: 0.46 K/UL
MONOCYTES NFR BLD AUTO: 8.8 %
NEUTROPHILS # BLD AUTO: 3.47 K/UL
NEUTROPHILS NFR BLD AUTO: 66.8 %
NITRITE URINE: NEGATIVE
NONHDLC SERPL-MCNC: 90 MG/DL
PH URINE: 6.5
PLATELET # BLD AUTO: 284 K/UL
POTASSIUM SERPL-SCNC: 4.1 MMOL/L
PROLACTIN SERPL-MCNC: 6.6 NG/ML
PROT SERPL-MCNC: 6.4 G/DL
PROTEIN URINE: NEGATIVE MG/DL
RBC # BLD: 4.4 M/UL
RBC # FLD: 12.7 %
RED BLOOD CELLS URINE: 0 /HPF
SODIUM SERPL-SCNC: 138 MMOL/L
SPECIFIC GRAVITY URINE: 1.01
T4 FREE SERPL-MCNC: 1.4 NG/DL
TRIGL SERPL-MCNC: 61 MG/DL
TSH SERPL-ACNC: 1.78 UIU/ML
UROBILINOGEN URINE: 0.2 MG/DL
VIT B12 SERPL-MCNC: 330 PG/ML
WBC # FLD AUTO: 5.2 K/UL
WHITE BLOOD CELLS URINE: 0 /HPF

## 2023-05-05 PROCEDURE — 93000 ELECTROCARDIOGRAM COMPLETE: CPT

## 2023-05-05 PROCEDURE — 99204 OFFICE O/P NEW MOD 45 MIN: CPT | Mod: 25

## 2023-05-05 NOTE — DISCUSSION/SUMMARY
[FreeTextEntry1] : suspect her sx may be due to ectopy given duration of sx and new onset\par will check a TTE to evaluate for structural heart disease\par check cardiac event monitor given the overall frequency of her symptoms\par cont heart healthy diet and exercise as tolerated [EKG obtained to assist in diagnosis and management of assessed problem(s)] : EKG obtained to assist in diagnosis and management of assessed problem(s)

## 2023-05-05 NOTE — HISTORY OF PRESENT ILLNESS
[FreeTextEntry1] : Referred by father - Giuliano Tolentino\par  is also a pt\par \par 32F  migraines, hypothyroidism, dyslipidemia, anxiety who presents for evaluation\par \par recently has been noting fluttering in the chest\par more with anxiety\par episodes last 15-30 minutes at a time\par symptoms are nonexertional and she exercises without symptoms\par \par occurs maybe 1-2 times a month. not associated with menstrual cycles\par no dizziness, SOB, or LOC\par \par No significant sympathomimetics.\par \par 4/25/23:\par Chol 165/TG 61/HDL 75/LDL 78\par \par Fam hx:\par Father - fatal CHF age 54\par Mat grandfather - fatal MI age 56\par \par Pregnancy hx:\par Once - uncomplicated

## 2023-05-10 ENCOUNTER — TRANSCRIPTION ENCOUNTER (OUTPATIENT)
Age: 33
End: 2023-05-10

## 2023-05-15 ENCOUNTER — NON-APPOINTMENT (OUTPATIENT)
Age: 33
End: 2023-05-15

## 2023-05-17 ENCOUNTER — APPOINTMENT (OUTPATIENT)
Dept: OBGYN | Facility: CLINIC | Age: 33
End: 2023-05-17
Payer: COMMERCIAL

## 2023-05-17 VITALS
DIASTOLIC BLOOD PRESSURE: 80 MMHG | BODY MASS INDEX: 24.33 KG/M2 | SYSTOLIC BLOOD PRESSURE: 116 MMHG | HEIGHT: 67 IN | WEIGHT: 155 LBS

## 2023-05-17 DIAGNOSIS — Z98.890 OTHER SPECIFIED POSTPROCEDURAL STATES: ICD-10-CM

## 2023-05-17 PROCEDURE — 99385 PREV VISIT NEW AGE 18-39: CPT

## 2023-05-17 NOTE — PLAN
[FreeTextEntry1] : \par Clinical breast exam performed and self breast exam explained along with indications for early screening\par Cervical Pap smear performed status post LEEP procedure.  If these results are normal patient will repeat this Pap smear in 6 months time and if those are normal she will go on to yearly Pap smear surveillance.\par Patient follows with endocrinology and should maintain these appointments as scheduled\par Patient follows with primary care physician and should maintain these appointments as scheduled\par She declined contraception at this time, but understands it may be made available at her request\par Patient was counseled on risk cervical incompetence,  labor and delivery secondary to a LEEP procedure, as she is planning a pregnancy within the next year.\par She is given option ask questions all questions were addressed\par She may return to office in 6 months time, or as needed.

## 2023-05-17 NOTE — HISTORY OF PRESENT ILLNESS
[FreeTextEntry1] : 32-year-old female G1, P1 presenting office to establish care with a new GYN, for her annual well appointment, and follow-up from a LEEP procedure 6 months prior.  She has no current complaints.  She is not on contraception, nor does she wish to be, as she is planning pregnancy within the next year.\par \par Obstetrical history 1 prior vaginal delivery without complication.  Denies gynecologic history uterine fibroids, ovarian cyst, STIs.  History of abnormal Pap smear requiring LEEP procedure.  Medical history of childhood asthma and hypothyroidism for which she takes 75 mcg levothyroxine daily.  She is followed by endocrine closely.  Additional surgical history of left meniscus repair.  Denies family or personal history of gynecologic, breast, colon cancer.  Social alcohol use, childhood allergy to Ceclor.

## 2023-05-17 NOTE — PHYSICAL EXAM
[Appropriately responsive] : appropriately responsive [Alert] : alert [No Acute Distress] : no acute distress [Soft] : soft [Non-tender] : non-tender [Non-distended] : non-distended [No HSM] : No HSM [No Lesions] : no lesions [No Mass] : no mass [Oriented x3] : oriented x3 [Examination Of The Breasts] : a normal appearance [Breast Palpation Diffuse Fibrous Tissue Bilateral] : fibrocystic changes [No Masses] : no breast masses were palpable [Labia Majora] : normal [Labia Minora] : normal [Normal] : normal [FreeTextEntry5] : Healthy appearing cervix status post LEEP procedure; Pap smear performed

## 2023-05-18 LAB — HPV HIGH+LOW RISK DNA PNL CVX: NOT DETECTED

## 2023-05-19 ENCOUNTER — APPOINTMENT (OUTPATIENT)
Dept: CARDIOLOGY | Facility: CLINIC | Age: 33
End: 2023-05-19
Payer: COMMERCIAL

## 2023-05-19 PROCEDURE — 93306 TTE W/DOPPLER COMPLETE: CPT

## 2023-05-22 LAB — CYTOLOGY CVX/VAG DOC THIN PREP: NORMAL

## 2023-05-26 PROBLEM — I47.29 NSVT (NONSUSTAINED VENTRICULAR TACHYCARDIA): Status: ACTIVE | Noted: 2023-05-26

## 2023-06-13 ENCOUNTER — APPOINTMENT (OUTPATIENT)
Dept: CARDIOLOGY | Facility: CLINIC | Age: 33
End: 2023-06-13
Payer: COMMERCIAL

## 2023-06-13 PROCEDURE — 93015 CV STRESS TEST SUPVJ I&R: CPT

## 2023-08-18 ENCOUNTER — OFFICE (OUTPATIENT)
Dept: URBAN - METROPOLITAN AREA CLINIC 104 | Facility: CLINIC | Age: 33
Setting detail: OPHTHALMOLOGY
End: 2023-08-18
Payer: COMMERCIAL

## 2023-08-18 DIAGNOSIS — H35.372: ICD-10-CM

## 2023-08-18 PROCEDURE — 92134 CPTRZ OPH DX IMG PST SGM RTA: CPT | Performed by: OPHTHALMOLOGY

## 2023-08-18 PROCEDURE — 92014 COMPRE OPH EXAM EST PT 1/>: CPT | Performed by: OPHTHALMOLOGY

## 2023-08-18 ASSESSMENT — REFRACTION_AUTOREFRACTION
OD_AXIS: 005
OD_SPHERE: -3.50
OD_CYLINDER: -1.50
OS_CYLINDER: -1.75
OS_AXIS: 177
OS_SPHERE: -3.50

## 2023-08-18 ASSESSMENT — KERATOMETRY
OS_K1POWER_DIOPTERS: 42.99
OS_AXISANGLE_DEGREES: 89
OS_K2POWER_DIOPTERS: 44.94
OD_K1POWER_DIOPTERS: 42.51
OD_AXISANGLE_DEGREES: 094
OD_K2POWER_DIOPTERS: 44.47

## 2023-08-18 ASSESSMENT — AXIALLENGTH_DERIVED
OS_AL: 25.23
OD_AL: 25.38

## 2023-08-18 ASSESSMENT — REFRACTION_CURRENTRX
OD_OVR_VA: 20/
OD_CYLINDER: -0.75
OS_CYLINDER: -0.75
OS_SPHERE: -4.25
OD_SPHERE: -4.00
OS_AXIS: 178
OS_OVR_VA: 20/
OD_AXIS: 005

## 2023-08-18 ASSESSMENT — SPHEQUIV_DERIVED
OD_SPHEQUIV: -4.25
OS_SPHEQUIV: -4.375

## 2023-08-18 ASSESSMENT — TONOMETRY
OD_IOP_MMHG: 18
OS_IOP_MMHG: 17

## 2023-08-18 ASSESSMENT — CONFRONTATIONAL VISUAL FIELD TEST (CVF)
OD_FINDINGS: FULL
OS_FINDINGS: FULL

## 2023-08-18 ASSESSMENT — VISUAL ACUITY
OS_BCVA: 20/20
OD_BCVA: 20/20-

## 2023-08-18 ASSESSMENT — LID EXAM ASSESSMENTS: OD_EDEMA: ABSENT

## 2023-10-02 ENCOUNTER — APPOINTMENT (OUTPATIENT)
Dept: INTERNAL MEDICINE | Facility: CLINIC | Age: 33
End: 2023-10-02
Payer: COMMERCIAL

## 2023-10-02 ENCOUNTER — TRANSCRIPTION ENCOUNTER (OUTPATIENT)
Age: 33
End: 2023-10-02

## 2023-10-02 ENCOUNTER — LABORATORY RESULT (OUTPATIENT)
Age: 33
End: 2023-10-02

## 2023-10-02 VITALS
TEMPERATURE: 98.3 F | RESPIRATION RATE: 12 BRPM | SYSTOLIC BLOOD PRESSURE: 122 MMHG | BODY MASS INDEX: 24.17 KG/M2 | HEART RATE: 81 BPM | WEIGHT: 154 LBS | OXYGEN SATURATION: 99 % | HEIGHT: 67 IN | DIASTOLIC BLOOD PRESSURE: 74 MMHG

## 2023-10-02 DIAGNOSIS — M25.50 PAIN IN UNSPECIFIED JOINT: ICD-10-CM

## 2023-10-02 DIAGNOSIS — M79.89 OTHER SPECIFIED SOFT TISSUE DISORDERS: ICD-10-CM

## 2023-10-02 DIAGNOSIS — R23.3 SPONTANEOUS ECCHYMOSES: ICD-10-CM

## 2023-10-02 DIAGNOSIS — Z82.61 FAMILY HISTORY OF ARTHRITIS: ICD-10-CM

## 2023-10-02 DIAGNOSIS — R29.898 OTHER SYMPTOMS AND SIGNS INVOLVING THE MUSCULOSKELETAL SYSTEM: ICD-10-CM

## 2023-10-02 DIAGNOSIS — R51.9 HEADACHE, UNSPECIFIED: ICD-10-CM

## 2023-10-02 DIAGNOSIS — Z83.6 FAMILY HISTORY OF OTHER DISEASES OF THE RESPIRATORY SYSTEM: ICD-10-CM

## 2023-10-02 PROCEDURE — 99214 OFFICE O/P EST MOD 30 MIN: CPT | Mod: 25

## 2023-10-02 PROCEDURE — 36415 COLL VENOUS BLD VENIPUNCTURE: CPT

## 2023-10-03 ENCOUNTER — RESULT REVIEW (OUTPATIENT)
Age: 33
End: 2023-10-03

## 2023-10-04 ENCOUNTER — NON-APPOINTMENT (OUTPATIENT)
Age: 33
End: 2023-10-04

## 2023-10-05 ENCOUNTER — NON-APPOINTMENT (OUTPATIENT)
Age: 33
End: 2023-10-05

## 2023-10-05 ENCOUNTER — TRANSCRIPTION ENCOUNTER (OUTPATIENT)
Age: 33
End: 2023-10-05

## 2023-10-05 LAB
ALBUMIN SERPL ELPH-MCNC: 5.1 G/DL
ALDOLASE SERPL-CCNC: 4 U/L
ALP BLD-CCNC: 47 U/L
ALT SERPL-CCNC: 12 U/L
ANA SER IF-ACNC: NEGATIVE
ANION GAP SERPL CALC-SCNC: 11 MMOL/L
APTT BLD: 32.2 SEC
AST SERPL-CCNC: 19 U/L
BASOPHILS # BLD AUTO: 0.04 K/UL
BASOPHILS NFR BLD AUTO: 0.6 %
BILIRUB SERPL-MCNC: 0.4 MG/DL
BUN SERPL-MCNC: 9 MG/DL
CALCIUM SERPL-MCNC: 9.5 MG/DL
CCP AB SER IA-ACNC: <8 UNITS
CHLORIDE SERPL-SCNC: 104 MMOL/L
CK SERPL-CCNC: 74 U/L
CO2 SERPL-SCNC: 22 MMOL/L
CREAT SERPL-MCNC: 0.59 MG/DL
CRP SERPL-MCNC: <3 MG/L
DSDNA AB SER-ACNC: <12 IU/ML
EGFR: 122 ML/MIN/1.73M2
EOSINOPHIL # BLD AUTO: 0.04 K/UL
EOSINOPHIL NFR BLD AUTO: 0.6 %
ERYTHROCYTE [SEDIMENTATION RATE] IN BLOOD BY WESTERGREN METHOD: 4 MM/HR
GLUCOSE SERPL-MCNC: 88 MG/DL
HCT VFR BLD CALC: 40.9 %
HGB BLD-MCNC: 13.6 G/DL
IMM GRANULOCYTES NFR BLD AUTO: 0.1 %
INR PPP: 0.95 RATIO
LYMPHOCYTES # BLD AUTO: 1.77 K/UL
LYMPHOCYTES NFR BLD AUTO: 26.5 %
MAN DIFF?: NORMAL
MCHC RBC-ENTMCNC: 30.6 PG
MCHC RBC-ENTMCNC: 33.3 GM/DL
MCV RBC AUTO: 91.9 FL
MONOCYTES # BLD AUTO: 0.48 K/UL
MONOCYTES NFR BLD AUTO: 7.2 %
NEUTROPHILS # BLD AUTO: 4.33 K/UL
NEUTROPHILS NFR BLD AUTO: 65 %
PLATELET # BLD AUTO: 241 K/UL
POTASSIUM SERPL-SCNC: 4.5 MMOL/L
PROT SERPL-MCNC: 7 G/DL
PT BLD: 10.7 SEC
RBC # BLD: 4.45 M/UL
RBC # FLD: 11.8 %
RF+CCP IGG SER-IMP: NEGATIVE
RHEUMATOID FACT SER QL: <10 IU/ML
SODIUM SERPL-SCNC: 137 MMOL/L
WBC # FLD AUTO: 6.67 K/UL

## 2023-10-06 ENCOUNTER — APPOINTMENT (OUTPATIENT)
Dept: MRI IMAGING | Facility: CLINIC | Age: 33
End: 2023-10-06
Payer: COMMERCIAL

## 2023-10-06 ENCOUNTER — RESULT REVIEW (OUTPATIENT)
Age: 33
End: 2023-10-06

## 2023-10-06 ENCOUNTER — TRANSCRIPTION ENCOUNTER (OUTPATIENT)
Age: 33
End: 2023-10-06

## 2023-10-06 PROCEDURE — 70553 MRI BRAIN STEM W/O & W/DYE: CPT

## 2023-10-06 PROCEDURE — A9585: CPT

## 2023-10-06 PROCEDURE — 72156 MRI NECK SPINE W/O & W/DYE: CPT

## 2023-10-06 PROCEDURE — A9585: CPT | Mod: JW

## 2023-10-08 ENCOUNTER — NON-APPOINTMENT (OUTPATIENT)
Age: 33
End: 2023-10-08

## 2023-10-09 ENCOUNTER — TRANSCRIPTION ENCOUNTER (OUTPATIENT)
Age: 33
End: 2023-10-09

## 2023-10-09 LAB
ACRM BINDING ANTIBODY: 0 NMOL/L
INTERPRETATION MG: NORMAL
VGCC-P/Q BIND AB SER-SCNC: 0 NMOL/L

## 2023-10-10 ENCOUNTER — NON-APPOINTMENT (OUTPATIENT)
Age: 33
End: 2023-10-10

## 2023-10-12 ENCOUNTER — APPOINTMENT (OUTPATIENT)
Dept: NEUROLOGY | Facility: CLINIC | Age: 33
End: 2023-10-12
Payer: COMMERCIAL

## 2023-10-12 VITALS
DIASTOLIC BLOOD PRESSURE: 76 MMHG | WEIGHT: 153 LBS | HEIGHT: 67 IN | HEART RATE: 73 BPM | SYSTOLIC BLOOD PRESSURE: 114 MMHG | BODY MASS INDEX: 24.01 KG/M2

## 2023-10-12 DIAGNOSIS — G43.109 MIGRAINE WITH AURA, NOT INTRACTABLE, W/OUT STATUS MIGRAINOSUS: ICD-10-CM

## 2023-10-12 DIAGNOSIS — R41.89 OTHER SYMPTOMS AND SIGNS INVOLVING COGNITIVE FUNCTIONS AND AWARENESS: ICD-10-CM

## 2023-10-12 PROCEDURE — 99205 OFFICE O/P NEW HI 60 MIN: CPT

## 2023-10-13 ENCOUNTER — TRANSCRIPTION ENCOUNTER (OUTPATIENT)
Age: 33
End: 2023-10-13

## 2023-10-16 ENCOUNTER — TRANSCRIPTION ENCOUNTER (OUTPATIENT)
Age: 33
End: 2023-10-16

## 2023-11-20 ENCOUNTER — APPOINTMENT (OUTPATIENT)
Dept: NEUROLOGY | Facility: CLINIC | Age: 33
End: 2023-11-20
Payer: COMMERCIAL

## 2023-11-20 DIAGNOSIS — G56.03 CARPAL TUNNEL SYNDROM,BILATERAL UPPER LIMBS: ICD-10-CM

## 2023-11-20 PROCEDURE — 95912 NRV CNDJ TEST 11-12 STUDIES: CPT

## 2023-11-20 PROCEDURE — 95886 MUSC TEST DONE W/N TEST COMP: CPT

## 2024-01-12 ENCOUNTER — APPOINTMENT (OUTPATIENT)
Dept: NEUROLOGY | Facility: CLINIC | Age: 34
End: 2024-01-12

## 2024-05-21 ENCOUNTER — APPOINTMENT (OUTPATIENT)
Dept: OBGYN | Facility: CLINIC | Age: 34
End: 2024-05-21
Payer: COMMERCIAL

## 2024-05-21 VITALS
DIASTOLIC BLOOD PRESSURE: 80 MMHG | WEIGHT: 154 LBS | SYSTOLIC BLOOD PRESSURE: 120 MMHG | HEIGHT: 67 IN | BODY MASS INDEX: 24.17 KG/M2

## 2024-05-21 DIAGNOSIS — Z01.419 ENCOUNTER FOR GYNECOLOGICAL EXAMINATION (GENERAL) (ROUTINE) W/OUT ABNORMAL FINDINGS: ICD-10-CM

## 2024-05-21 PROCEDURE — 99395 PREV VISIT EST AGE 18-39: CPT

## 2024-05-21 NOTE — COUNSELING
[Nutrition/ Exercise/ Weight Management] : nutrition, exercise, weight management [Vitamins/Supplements] : vitamins/supplements [Breast Self Exam] : breast self exam [Contraception/ Emergency Contraception/ Safe Sexual Practices] : contraception, emergency contraception, safe sexual practices [Confidentiality] : confidentiality [Lab Results] : lab results

## 2024-05-21 NOTE — HISTORY OF PRESENT ILLNESS
[FreeTextEntry1] : 32-year-old female G1, P1 presenting office for annual well woman appointment.  She has no current complaints.  She does have a history of a LEEP procedure 1 year prior at her subsequent follow-up Pap smear 6 months after the procedure was negative.  She has no current complaints.  She is not on contraception, nor does she wish to be at this time.  She did experience migraines with aura when on a combined oral contraceptive.  Obstetrical history 1 prior vaginal delivery without complication.  Denies gynecologic history uterine fibroids, ovarian cyst, STIs.  History of abnormal Pap smear requiring LEEP procedure; 6-month cervical Pap smear 3/17/2023 resulted NILM and negative high-risk HPV.  Medical history of childhood asthma and hypothyroidism for which she takes 75 mcg levothyroxine daily.  She is followed by endocrinology.  Additional surgical history of left meniscus repair.  Denies family or personal history of gynecologic, breast, colon cancer.  Social alcohol use, childhood allergy to Ceclor.

## 2024-05-21 NOTE — PLAN
[FreeTextEntry1] : Clinical breast exam performed and self breast exam explained along with indications for early screening.  Cervical Pap smear performed and patient was counseled if this Pap smear results normal, she may return to a yearly schedule.  She does have a history of a LEEP procedure and her follow-up 6-month Pap smear resulted negative.  She should continue with her primary care physician and endocrinologist, as directed.  She does not wish for contraception at this time, but understands it may be made available upon her request.  She is given option ask questions all questions addressed May return to office in 1 years time, or as needed.

## 2024-05-23 LAB — HPV HIGH+LOW RISK DNA PNL CVX: NOT DETECTED

## 2024-05-29 LAB — CYTOLOGY CVX/VAG DOC THIN PREP: NORMAL

## 2024-07-08 ENCOUNTER — APPOINTMENT (OUTPATIENT)
Dept: DERMATOLOGY | Facility: CLINIC | Age: 34
End: 2024-07-08
Payer: COMMERCIAL

## 2024-07-08 PROCEDURE — 99203 OFFICE O/P NEW LOW 30 MIN: CPT

## 2024-08-22 ENCOUNTER — OFFICE (OUTPATIENT)
Dept: URBAN - METROPOLITAN AREA CLINIC 104 | Facility: CLINIC | Age: 34
Setting detail: OPHTHALMOLOGY
End: 2024-08-22
Payer: COMMERCIAL

## 2024-08-22 ENCOUNTER — NON-APPOINTMENT (OUTPATIENT)
Age: 34
End: 2024-08-22

## 2024-08-22 DIAGNOSIS — H35.372: ICD-10-CM

## 2024-08-22 PROCEDURE — 92134 CPTRZ OPH DX IMG PST SGM RTA: CPT | Performed by: OPHTHALMOLOGY

## 2024-08-22 PROCEDURE — 92014 COMPRE OPH EXAM EST PT 1/>: CPT | Performed by: OPHTHALMOLOGY

## 2024-08-22 ASSESSMENT — CONFRONTATIONAL VISUAL FIELD TEST (CVF)
OS_FINDINGS: FULL
OD_FINDINGS: FULL

## 2024-09-04 ENCOUNTER — APPOINTMENT (OUTPATIENT)
Dept: OBGYN | Facility: CLINIC | Age: 34
End: 2024-09-04
Payer: COMMERCIAL

## 2024-09-04 ENCOUNTER — ASOB RESULT (OUTPATIENT)
Age: 34
End: 2024-09-04

## 2024-09-04 PROCEDURE — 76817 TRANSVAGINAL US OBSTETRIC: CPT

## 2024-09-10 ENCOUNTER — APPOINTMENT (OUTPATIENT)
Dept: OBGYN | Facility: CLINIC | Age: 34
End: 2024-09-10
Payer: COMMERCIAL

## 2024-09-10 VITALS
SYSTOLIC BLOOD PRESSURE: 119 MMHG | HEIGHT: 67 IN | BODY MASS INDEX: 24.17 KG/M2 | DIASTOLIC BLOOD PRESSURE: 80 MMHG | WEIGHT: 154 LBS

## 2024-09-10 DIAGNOSIS — O21.9 VOMITING OF PREGNANCY, UNSPECIFIED: ICD-10-CM

## 2024-09-10 DIAGNOSIS — Z34.90 ENCOUNTER FOR SUPERVISION OF NORMAL PREGNANCY, UNSPECIFIED, UNSPECIFIED TRIMESTER: ICD-10-CM

## 2024-09-10 PROCEDURE — 76817 TRANSVAGINAL US OBSTETRIC: CPT

## 2024-09-10 PROCEDURE — 99214 OFFICE O/P EST MOD 30 MIN: CPT

## 2024-09-10 NOTE — PROCEDURE
[Transvaginal OB Sonogram] : Transvaginal OB Sonogram [Intrauterine Pregnancy] : intrauterine pregnancy [Yolk Sac] : yolk sac present [Fetal Heart] : fetal heart present [CRL: ___ (mm)] : CRL - [unfilled]Umm [Date: ___] : Date: [unfilled] [Current GA by Sonogram: ___ (wks)] : Current GA by Sonogram: [unfilled]Uwks [___ day(s)] : [unfilled] days [Transvaginal OB Sonogram WNL] : Transvaginal OB Sonogram WNL [FreeTextEntry1] : Viable intrauterine pregnancy, fundally located, fetal heart rate 125 bpm

## 2024-09-10 NOTE — HISTORY OF PRESENT ILLNESS
[FreeTextEntry1] : 34-year-old -0-0-1 presenting office for confirmation of pregnancy/dating.  LMP is 2024 placing her at 6 weeks 6 days gestation with an EDC of 2025.  She did have an ultrasound performed 2024 showing a viable intrauterine pregnancy measuring appropriately with a fetal heart rate of 104 bpm.  Patient does feel nauseous and does have a history of recurrent migraine headaches for which she usually takes ibuprofen.  She has family history concerns of her mother-in-law with a ventral septal defect, she has a brother with Kallmann's syndrome.  Obstetrical history 1 prior vaginal delivery without complication.  Denies gynecologic history uterine fibroids, ovarian cyst, STIs.  History of abnormal Pap smear requiring LEEP procedure; 6-month cervical Pap smear 2024 resulted NILM and negative high-risk HPV.  Medical history of childhood asthma and hypothyroidism for which she takes 75 mcg levothyroxine daily.  She is followed by endocrinology.  Additional surgical history of left meniscus repair.  Denies family or personal history of gynecologic, breast, colon cancer.  Social alcohol use, childhood allergy to Ceclor.

## 2024-09-10 NOTE — PLAN
[FreeTextEntry1] : Transvaginal/pelvic ultrasound significant for a viable intrauterine pregnancy consistent with reported dating via LMP and EDC will remain 4/30/2025.  She return to office in approximately 3 weeks time for initial prenatal appointment.  Rx for Diclegis sent to pharmacy with direction secondary to nausea and vomiting.  Genetic carrier screening will be discussed at the time of her initial prenatal appointment, including history of common soft syndrome.  Patient will have a fetal cardiac echo around 24 weeks secondary to a family history of VSD.  She is given opportunist questions all questions were addressed.  She understands plan of care.

## 2024-09-19 RX ORDER — DOXYLAMINE SUCCINATE AND PYRIDOXINE HYDROCHLORIDE 10; 10 MG/1; MG/1
10-10 TABLET, DELAYED RELEASE ORAL
Qty: 60 | Refills: 3 | Status: ACTIVE | COMMUNITY
Start: 2024-09-10 | End: 1900-01-01

## 2024-09-22 ENCOUNTER — TRANSCRIPTION ENCOUNTER (OUTPATIENT)
Age: 34
End: 2024-09-22

## 2024-10-03 ENCOUNTER — NON-APPOINTMENT (OUTPATIENT)
Age: 34
End: 2024-10-03

## 2024-10-04 ENCOUNTER — NON-APPOINTMENT (OUTPATIENT)
Age: 34
End: 2024-10-04

## 2024-10-04 ENCOUNTER — APPOINTMENT (OUTPATIENT)
Dept: OBGYN | Facility: CLINIC | Age: 34
End: 2024-10-04
Payer: COMMERCIAL

## 2024-10-04 ENCOUNTER — ASOB RESULT (OUTPATIENT)
Age: 34
End: 2024-10-04

## 2024-10-04 ENCOUNTER — APPOINTMENT (OUTPATIENT)
Dept: ANTEPARTUM | Facility: CLINIC | Age: 34
End: 2024-10-04
Payer: COMMERCIAL

## 2024-10-04 VITALS
SYSTOLIC BLOOD PRESSURE: 118 MMHG | WEIGHT: 160 LBS | DIASTOLIC BLOOD PRESSURE: 69 MMHG | HEIGHT: 67 IN | BODY MASS INDEX: 25.11 KG/M2

## 2024-10-04 DIAGNOSIS — O34.40 MATERNAL CARE FOR OTHER ABNORMALITIES OF CERVIX, UNSPECIFIED TRIMESTER: ICD-10-CM

## 2024-10-04 DIAGNOSIS — O99.280 ENDOCRINE, NUTRITIONAL AND METABOLIC DISEASES COMPLICATING PREGNANCY, UNSPECIFIED TRIMESTER: ICD-10-CM

## 2024-10-04 DIAGNOSIS — Z34.91 ENCOUNTER FOR SUPERVISION OF NORMAL PREGNANCY, UNSPECIFIED, FIRST TRIMESTER: ICD-10-CM

## 2024-10-04 DIAGNOSIS — E03.9 ENDOCRINE, NUTRITIONAL AND METABOLIC DISEASES COMPLICATING PREGNANCY, UNSPECIFIED TRIMESTER: ICD-10-CM

## 2024-10-04 DIAGNOSIS — Z34.90 ENCOUNTER FOR SUPERVISION OF NORMAL PREGNANCY, UNSPECIFIED, UNSPECIFIED TRIMESTER: ICD-10-CM

## 2024-10-04 DIAGNOSIS — Z98.890 MATERNAL CARE FOR OTHER ABNORMALITIES OF CERVIX, UNSPECIFIED TRIMESTER: ICD-10-CM

## 2024-10-04 PROCEDURE — 76801 OB US < 14 WKS SINGLE FETUS: CPT

## 2024-10-04 PROCEDURE — 0500F INITIAL PRENATAL CARE VISIT: CPT

## 2024-10-14 ENCOUNTER — NON-APPOINTMENT (OUTPATIENT)
Age: 34
End: 2024-10-14

## 2024-10-18 ENCOUNTER — ASOB RESULT (OUTPATIENT)
Age: 34
End: 2024-10-18

## 2024-10-18 ENCOUNTER — APPOINTMENT (OUTPATIENT)
Dept: ANTEPARTUM | Facility: CLINIC | Age: 34
End: 2024-10-18
Payer: COMMERCIAL

## 2024-10-18 ENCOUNTER — APPOINTMENT (OUTPATIENT)
Dept: MATERNAL FETAL MEDICINE | Facility: CLINIC | Age: 34
End: 2024-10-18

## 2024-10-18 VITALS
BODY MASS INDEX: 25.11 KG/M2 | SYSTOLIC BLOOD PRESSURE: 116 MMHG | OXYGEN SATURATION: 98 % | DIASTOLIC BLOOD PRESSURE: 76 MMHG | HEIGHT: 67 IN | RESPIRATION RATE: 18 BRPM | HEART RATE: 73 BPM | WEIGHT: 160 LBS

## 2024-10-18 DIAGNOSIS — Z86.69 PERSONAL HISTORY OF OTHER DISEASES OF THE NERVOUS SYSTEM AND SENSE ORGANS: ICD-10-CM

## 2024-10-18 DIAGNOSIS — R29.898 OTHER SYMPTOMS AND SIGNS INVOLVING THE MUSCULOSKELETAL SYSTEM: ICD-10-CM

## 2024-10-18 DIAGNOSIS — R41.89 OTHER SYMPTOMS AND SIGNS INVOLVING COGNITIVE FUNCTIONS AND AWARENESS: ICD-10-CM

## 2024-10-18 DIAGNOSIS — Z82.79 FAMILY HISTORY OF OTHER CONGENITAL MALFORMATIONS, DEFORMATIONS AND CHROMOSOMAL ABNORMALITIES: ICD-10-CM

## 2024-10-18 DIAGNOSIS — Z98.890 OTHER SPECIFIED POSTPROCEDURAL STATES: ICD-10-CM

## 2024-10-18 DIAGNOSIS — R85.611 ATYPICAL SQUAMOUS CELLS CANNOT EXCLUDE HIGH GRADE SQUAMOUS INTRAEPITHELIAL LESION ON CYTOLOGIC SMEAR OF ANUS (ASC-H): ICD-10-CM

## 2024-10-18 DIAGNOSIS — Z3A.12 12 WEEKS GESTATION OF PREGNANCY: ICD-10-CM

## 2024-10-18 DIAGNOSIS — R68.82 DECREASED LIBIDO: ICD-10-CM

## 2024-10-18 PROCEDURE — 76813 OB US NUCHAL MEAS 1 GEST: CPT

## 2024-10-18 PROCEDURE — 99205 OFFICE O/P NEW HI 60 MIN: CPT

## 2024-11-01 ENCOUNTER — NON-APPOINTMENT (OUTPATIENT)
Age: 34
End: 2024-11-01

## 2024-11-01 ENCOUNTER — APPOINTMENT (OUTPATIENT)
Dept: OBGYN | Facility: CLINIC | Age: 34
End: 2024-11-01
Payer: COMMERCIAL

## 2024-11-01 VITALS
HEIGHT: 67 IN | DIASTOLIC BLOOD PRESSURE: 70 MMHG | BODY MASS INDEX: 25.27 KG/M2 | WEIGHT: 161 LBS | SYSTOLIC BLOOD PRESSURE: 105 MMHG

## 2024-11-01 DIAGNOSIS — Z34.92 ENCOUNTER FOR SUPERVISION OF NORMAL PREGNANCY, UNSPECIFIED, SECOND TRIMESTER: ICD-10-CM

## 2024-11-01 DIAGNOSIS — Z3A.12 12 WEEKS GESTATION OF PREGNANCY: ICD-10-CM

## 2024-11-01 PROCEDURE — 0502F SUBSEQUENT PRENATAL CARE: CPT

## 2024-11-06 ENCOUNTER — ASOB RESULT (OUTPATIENT)
Age: 34
End: 2024-11-06

## 2024-11-06 ENCOUNTER — APPOINTMENT (OUTPATIENT)
Dept: MATERNAL FETAL MEDICINE | Facility: CLINIC | Age: 34
End: 2024-11-06
Payer: COMMERCIAL

## 2024-11-06 PROCEDURE — 99212 OFFICE O/P EST SF 10 MIN: CPT | Mod: 95

## 2024-11-15 ENCOUNTER — NON-APPOINTMENT (OUTPATIENT)
Age: 34
End: 2024-11-15

## 2024-11-15 ENCOUNTER — ASOB RESULT (OUTPATIENT)
Age: 34
End: 2024-11-15

## 2024-11-15 ENCOUNTER — APPOINTMENT (OUTPATIENT)
Dept: ANTEPARTUM | Facility: CLINIC | Age: 34
End: 2024-11-15
Payer: COMMERCIAL

## 2024-11-15 PROCEDURE — 76817 TRANSVAGINAL US OBSTETRIC: CPT

## 2024-11-15 PROCEDURE — 36415 COLL VENOUS BLD VENIPUNCTURE: CPT

## 2024-11-19 ENCOUNTER — NON-APPOINTMENT (OUTPATIENT)
Age: 34
End: 2024-11-19

## 2024-12-02 ENCOUNTER — APPOINTMENT (OUTPATIENT)
Dept: ANTEPARTUM | Facility: CLINIC | Age: 34
End: 2024-12-02
Payer: COMMERCIAL

## 2024-12-02 ENCOUNTER — ASOB RESULT (OUTPATIENT)
Age: 34
End: 2024-12-02

## 2024-12-02 PROCEDURE — 76817 TRANSVAGINAL US OBSTETRIC: CPT

## 2024-12-03 ENCOUNTER — NON-APPOINTMENT (OUTPATIENT)
Age: 34
End: 2024-12-03

## 2024-12-03 ENCOUNTER — APPOINTMENT (OUTPATIENT)
Dept: OBGYN | Facility: CLINIC | Age: 34
End: 2024-12-03
Payer: COMMERCIAL

## 2024-12-03 VITALS
WEIGHT: 167 LBS | SYSTOLIC BLOOD PRESSURE: 116 MMHG | HEIGHT: 67 IN | BODY MASS INDEX: 26.21 KG/M2 | DIASTOLIC BLOOD PRESSURE: 76 MMHG

## 2024-12-03 DIAGNOSIS — O34.40 MATERNAL CARE FOR OTHER ABNORMALITIES OF CERVIX, UNSPECIFIED TRIMESTER: ICD-10-CM

## 2024-12-03 DIAGNOSIS — Z34.92 ENCOUNTER FOR SUPERVISION OF NORMAL PREGNANCY, UNSPECIFIED, SECOND TRIMESTER: ICD-10-CM

## 2024-12-03 DIAGNOSIS — Z98.890 MATERNAL CARE FOR OTHER ABNORMALITIES OF CERVIX, UNSPECIFIED TRIMESTER: ICD-10-CM

## 2024-12-03 PROCEDURE — 0502F SUBSEQUENT PRENATAL CARE: CPT

## 2024-12-13 ENCOUNTER — APPOINTMENT (OUTPATIENT)
Dept: ANTEPARTUM | Facility: CLINIC | Age: 34
End: 2024-12-13
Payer: COMMERCIAL

## 2024-12-13 ENCOUNTER — ASOB RESULT (OUTPATIENT)
Age: 34
End: 2024-12-13

## 2024-12-13 ENCOUNTER — APPOINTMENT (OUTPATIENT)
Dept: INTERNAL MEDICINE | Facility: CLINIC | Age: 34
End: 2024-12-13

## 2024-12-13 PROCEDURE — 76817 TRANSVAGINAL US OBSTETRIC: CPT

## 2024-12-13 PROCEDURE — 76811 OB US DETAILED SNGL FETUS: CPT

## 2024-12-17 ENCOUNTER — NON-APPOINTMENT (OUTPATIENT)
Age: 34
End: 2024-12-17

## 2024-12-27 ENCOUNTER — APPOINTMENT (OUTPATIENT)
Dept: PEDIATRIC CARDIOLOGY | Facility: CLINIC | Age: 34
End: 2024-12-27

## 2024-12-27 DIAGNOSIS — O35.9XX0 MATERNAL CARE FOR (SUSPECTED) FETAL ABNORMALITY AND DAMAGE, UNSPECIFIED, NOT APPLICABLE OR UNSPECIFIED: ICD-10-CM

## 2024-12-27 DIAGNOSIS — O35.2XX0 MATERNAL CARE FOR (SUSPECTED) HEREDITARY DISEASE IN FETUS, NOT APPLICABLE OR UNSPECIFIED: ICD-10-CM

## 2024-12-27 PROCEDURE — 76821 MIDDLE CEREBRAL ARTERY ECHO: CPT

## 2024-12-27 PROCEDURE — 93325 DOPPLER ECHO COLOR FLOW MAPG: CPT | Mod: 59

## 2024-12-27 PROCEDURE — 76827 ECHO EXAM OF FETAL HEART: CPT

## 2024-12-27 PROCEDURE — 99203 OFFICE O/P NEW LOW 30 MIN: CPT

## 2024-12-27 PROCEDURE — 76825 ECHO EXAM OF FETAL HEART: CPT

## 2024-12-27 PROCEDURE — 76820 UMBILICAL ARTERY ECHO: CPT

## 2025-01-03 ENCOUNTER — NON-APPOINTMENT (OUTPATIENT)
Age: 35
End: 2025-01-03

## 2025-01-03 ENCOUNTER — APPOINTMENT (OUTPATIENT)
Dept: OBGYN | Facility: CLINIC | Age: 35
End: 2025-01-03
Payer: COMMERCIAL

## 2025-01-03 VITALS
SYSTOLIC BLOOD PRESSURE: 125 MMHG | WEIGHT: 174 LBS | HEIGHT: 67 IN | BODY MASS INDEX: 27.31 KG/M2 | DIASTOLIC BLOOD PRESSURE: 75 MMHG

## 2025-01-03 DIAGNOSIS — Z34.92 ENCOUNTER FOR SUPERVISION OF NORMAL PREGNANCY, UNSPECIFIED, SECOND TRIMESTER: ICD-10-CM

## 2025-01-03 DIAGNOSIS — O99.280 ENDOCRINE, NUTRITIONAL AND METABOLIC DISEASES COMPLICATING PREGNANCY, UNSPECIFIED TRIMESTER: ICD-10-CM

## 2025-01-03 DIAGNOSIS — E03.9 ENDOCRINE, NUTRITIONAL AND METABOLIC DISEASES COMPLICATING PREGNANCY, UNSPECIFIED TRIMESTER: ICD-10-CM

## 2025-01-03 PROCEDURE — 0502F SUBSEQUENT PRENATAL CARE: CPT

## 2025-01-07 ENCOUNTER — ASOB RESULT (OUTPATIENT)
Age: 35
End: 2025-01-07

## 2025-01-07 ENCOUNTER — APPOINTMENT (OUTPATIENT)
Dept: ANTEPARTUM | Facility: CLINIC | Age: 35
End: 2025-01-07
Payer: COMMERCIAL

## 2025-01-07 ENCOUNTER — NON-APPOINTMENT (OUTPATIENT)
Age: 35
End: 2025-01-07

## 2025-01-07 PROCEDURE — 76816 OB US FOLLOW-UP PER FETUS: CPT

## 2025-01-07 PROCEDURE — 76817 TRANSVAGINAL US OBSTETRIC: CPT

## 2025-01-15 ENCOUNTER — INPATIENT (INPATIENT)
Facility: HOSPITAL | Age: 35
LOS: 1 days | Discharge: HOME | DRG: 833 | End: 2025-01-17
Attending: OBSTETRICS & GYNECOLOGY | Admitting: OBSTETRICS & GYNECOLOGY
Payer: COMMERCIAL

## 2025-01-15 ENCOUNTER — RESULT REVIEW (OUTPATIENT)
Age: 35
End: 2025-01-15

## 2025-01-15 ENCOUNTER — NON-APPOINTMENT (OUTPATIENT)
Age: 35
End: 2025-01-15

## 2025-01-15 VITALS
RESPIRATION RATE: 18 BRPM | SYSTOLIC BLOOD PRESSURE: 117 MMHG | HEART RATE: 106 BPM | DIASTOLIC BLOOD PRESSURE: 63 MMHG | TEMPERATURE: 98 F

## 2025-01-15 DIAGNOSIS — O26.899 OTHER SPECIFIED PREGNANCY RELATED CONDITIONS, UNSPECIFIED TRIMESTER: ICD-10-CM

## 2025-01-15 DIAGNOSIS — Z98.890 OTHER SPECIFIED POSTPROCEDURAL STATES: Chronic | ICD-10-CM

## 2025-01-15 LAB
APPEARANCE UR: CLEAR — SIGNIFICANT CHANGE UP
BILIRUB UR-MCNC: NEGATIVE — SIGNIFICANT CHANGE UP
COLOR SPEC: YELLOW — SIGNIFICANT CHANGE UP
DIFF PNL FLD: NEGATIVE — SIGNIFICANT CHANGE UP
GLUCOSE UR QL: 100 MG/DL
HCT VFR BLD CALC: 35.4 % — SIGNIFICANT CHANGE UP (ref 34.5–45)
HGB BLD-MCNC: 12.3 G/DL — SIGNIFICANT CHANGE UP (ref 11.5–15.5)
KETONES UR-MCNC: NEGATIVE MG/DL — SIGNIFICANT CHANGE UP
LEUKOCYTE ESTERASE UR-ACNC: NEGATIVE — SIGNIFICANT CHANGE UP
MCHC RBC-ENTMCNC: 31.5 PG — SIGNIFICANT CHANGE UP (ref 27–34)
MCHC RBC-ENTMCNC: 34.7 G/DL — SIGNIFICANT CHANGE UP (ref 32–36)
MCV RBC AUTO: 90.8 FL — SIGNIFICANT CHANGE UP (ref 80–100)
NITRITE UR-MCNC: NEGATIVE — SIGNIFICANT CHANGE UP
PH UR: 6.5 — SIGNIFICANT CHANGE UP (ref 5–8)
PLATELET # BLD AUTO: 203 K/UL — SIGNIFICANT CHANGE UP (ref 150–400)
PROT UR-MCNC: NEGATIVE MG/DL — SIGNIFICANT CHANGE UP
RAPID RVP RESULT: SIGNIFICANT CHANGE UP
RBC # BLD: 3.9 M/UL — SIGNIFICANT CHANGE UP (ref 3.8–5.2)
RBC # FLD: 12.3 % — SIGNIFICANT CHANGE UP (ref 10.3–14.5)
SARS-COV-2 RNA SPEC QL NAA+PROBE: SIGNIFICANT CHANGE UP
SP GR SPEC: <1.005 — LOW (ref 1–1.03)
UROBILINOGEN FLD QL: 0.2 MG/DL — SIGNIFICANT CHANGE UP (ref 0.2–1)
WBC # BLD: 8.53 K/UL — SIGNIFICANT CHANGE UP (ref 3.8–10.5)
WBC # FLD AUTO: 8.53 K/UL — SIGNIFICANT CHANGE UP (ref 3.8–10.5)

## 2025-01-15 PROCEDURE — 71045 X-RAY EXAM CHEST 1 VIEW: CPT | Mod: 26

## 2025-01-15 RX ORDER — ALBUTEROL SULFATE 90 UG/1
2 INHALANT RESPIRATORY (INHALATION)
Qty: 1 | Refills: 0
Start: 2025-01-15 | End: 2025-01-21

## 2025-01-15 RX ORDER — ACETAMINOPHEN 80 MG/.8ML
1000 SOLUTION/ DROPS ORAL ONCE
Refills: 0 | Status: COMPLETED | OUTPATIENT
Start: 2025-01-15 | End: 2025-01-15

## 2025-01-15 RX ORDER — OSELTAMIVIR 75 MG/1
1 CAPSULE ORAL
Qty: 10 | Refills: 0
Start: 2025-01-15 | End: 2025-01-19

## 2025-01-15 RX ORDER — OSELTAMIVIR 75 MG/1
75 CAPSULE ORAL EVERY 24 HOURS
Refills: 0 | Status: DISCONTINUED | OUTPATIENT
Start: 2025-01-15 | End: 2025-01-16

## 2025-01-15 RX ORDER — SODIUM CHLORIDE 9 MG/ML
1000 INJECTION, SOLUTION INTRAVENOUS ONCE
Refills: 0 | Status: COMPLETED | OUTPATIENT
Start: 2025-01-15 | End: 2025-01-15

## 2025-01-15 RX ADMIN — OSELTAMIVIR 75 MILLIGRAM(S): 75 CAPSULE ORAL at 22:15

## 2025-01-15 RX ADMIN — ACETAMINOPHEN 400 MILLIGRAM(S): 80 SOLUTION/ DROPS ORAL at 23:50

## 2025-01-15 RX ADMIN — SODIUM CHLORIDE 1000 MILLILITER(S): 9 INJECTION, SOLUTION INTRAVENOUS at 22:16

## 2025-01-16 DIAGNOSIS — O99.280 ENDOCRINE, NUTRITIONAL AND METABOLIC DISEASES COMPLICATING PREGNANCY, UNSPECIFIED TRIMESTER: ICD-10-CM

## 2025-01-16 DIAGNOSIS — O99.019 ANEMIA COMPLICATING PREGNANCY, UNSPECIFIED TRIMESTER: ICD-10-CM

## 2025-01-16 DIAGNOSIS — R50.9 FEVER, UNSPECIFIED: ICD-10-CM

## 2025-01-16 DIAGNOSIS — E87.6 HYPOKALEMIA: ICD-10-CM

## 2025-01-16 DIAGNOSIS — O99.512 DISEASES OF THE RESPIRATORY SYSTEM COMPLICATING PREGNANCY, SECOND TRIMESTER: ICD-10-CM

## 2025-01-16 DIAGNOSIS — E03.9 HYPOTHYROIDISM, UNSPECIFIED: ICD-10-CM

## 2025-01-16 DIAGNOSIS — O46.90 ANTEPARTUM HEMORRHAGE, UNSPECIFIED, UNSPECIFIED TRIMESTER: ICD-10-CM

## 2025-01-16 DIAGNOSIS — Z3A.25 25 WEEKS GESTATION OF PREGNANCY: ICD-10-CM

## 2025-01-16 LAB
ALBUMIN SERPL ELPH-MCNC: 3.1 G/DL — LOW (ref 3.3–5.2)
ALP SERPL-CCNC: 60 U/L — SIGNIFICANT CHANGE UP (ref 40–120)
ALT FLD-CCNC: 9 U/L — SIGNIFICANT CHANGE UP
ANION GAP SERPL CALC-SCNC: 11 MMOL/L — SIGNIFICANT CHANGE UP (ref 5–17)
APTT BLD: 27.7 SEC — SIGNIFICANT CHANGE UP (ref 24.5–35.6)
AST SERPL-CCNC: 14 U/L — SIGNIFICANT CHANGE UP
BASOPHILS # BLD AUTO: 0.02 K/UL — SIGNIFICANT CHANGE UP (ref 0–0.2)
BASOPHILS NFR BLD AUTO: 0.3 % — SIGNIFICANT CHANGE UP (ref 0–2)
BILIRUB SERPL-MCNC: 0.3 MG/DL — LOW (ref 0.4–2)
BUN SERPL-MCNC: 4.6 MG/DL — LOW (ref 8–20)
CALCIUM SERPL-MCNC: 7.7 MG/DL — LOW (ref 8.4–10.5)
CHLORIDE SERPL-SCNC: 104 MMOL/L — SIGNIFICANT CHANGE UP (ref 96–108)
CO2 SERPL-SCNC: 19 MMOL/L — LOW (ref 22–29)
CREAT SERPL-MCNC: 0.41 MG/DL — LOW (ref 0.5–1.3)
EGFR: 132 ML/MIN/1.73M2 — SIGNIFICANT CHANGE UP
EOSINOPHIL # BLD AUTO: 0 K/UL — SIGNIFICANT CHANGE UP (ref 0–0.5)
EOSINOPHIL NFR BLD AUTO: 0 % — SIGNIFICANT CHANGE UP (ref 0–6)
GLUCOSE SERPL-MCNC: 139 MG/DL — HIGH (ref 70–99)
GRAM STN FLD: ABNORMAL
HCT VFR BLD CALC: 30.5 % — LOW (ref 34.5–45)
HGB BLD-MCNC: 10.5 G/DL — LOW (ref 11.5–15.5)
IMM GRANULOCYTES NFR BLD AUTO: 0.5 % — SIGNIFICANT CHANGE UP (ref 0–0.9)
INR BLD: 1 RATIO — SIGNIFICANT CHANGE UP (ref 0.85–1.16)
LACTATE SERPL-SCNC: 1.4 MMOL/L — SIGNIFICANT CHANGE UP (ref 0.5–2)
LYMPHOCYTES # BLD AUTO: 0.4 K/UL — LOW (ref 1–3.3)
LYMPHOCYTES # BLD AUTO: 5.3 % — LOW (ref 13–44)
MCHC RBC-ENTMCNC: 31.3 PG — SIGNIFICANT CHANGE UP (ref 27–34)
MCHC RBC-ENTMCNC: 34.4 G/DL — SIGNIFICANT CHANGE UP (ref 32–36)
MCV RBC AUTO: 91 FL — SIGNIFICANT CHANGE UP (ref 80–100)
MONOCYTES # BLD AUTO: 0.48 K/UL — SIGNIFICANT CHANGE UP (ref 0–0.9)
MONOCYTES NFR BLD AUTO: 6.3 % — SIGNIFICANT CHANGE UP (ref 2–14)
NEUTROPHILS # BLD AUTO: 6.65 K/UL — SIGNIFICANT CHANGE UP (ref 1.8–7.4)
NEUTROPHILS NFR BLD AUTO: 87.6 % — HIGH (ref 43–77)
PLATELET # BLD AUTO: 172 K/UL — SIGNIFICANT CHANGE UP (ref 150–400)
POTASSIUM SERPL-MCNC: 3.1 MMOL/L — LOW (ref 3.5–5.3)
POTASSIUM SERPL-SCNC: 3.1 MMOL/L — LOW (ref 3.5–5.3)
PROCALCITONIN SERPL-MCNC: 0.09 NG/ML — SIGNIFICANT CHANGE UP (ref 0.02–0.1)
PROT SERPL-MCNC: 5.4 G/DL — LOW (ref 6.6–8.7)
PROTHROM AB SERPL-ACNC: 11.3 SEC — SIGNIFICANT CHANGE UP (ref 9.9–13.4)
RBC # BLD: 3.35 M/UL — LOW (ref 3.8–5.2)
RBC # FLD: 12.4 % — SIGNIFICANT CHANGE UP (ref 10.3–14.5)
SODIUM SERPL-SCNC: 134 MMOL/L — LOW (ref 135–145)
SPECIMEN SOURCE: SIGNIFICANT CHANGE UP
WBC # BLD: 7.59 K/UL — SIGNIFICANT CHANGE UP (ref 3.8–10.5)
WBC # FLD AUTO: 7.59 K/UL — SIGNIFICANT CHANGE UP (ref 3.8–10.5)

## 2025-01-16 PROCEDURE — 99222 1ST HOSP IP/OBS MODERATE 55: CPT

## 2025-01-16 RX ORDER — LEVOTHYROXINE SODIUM 175 UG/1
75 TABLET ORAL DAILY
Refills: 0 | Status: DISCONTINUED | OUTPATIENT
Start: 2025-01-16 | End: 2025-01-17

## 2025-01-16 RX ORDER — POTASSIUM CHLORIDE 600 MG/1
20 TABLET, FILM COATED, EXTENDED RELEASE ORAL EVERY 4 HOURS
Refills: 0 | Status: COMPLETED | OUTPATIENT
Start: 2025-01-16 | End: 2025-01-16

## 2025-01-16 RX ORDER — ACETAMINOPHEN 80 MG/.8ML
1000 SOLUTION/ DROPS ORAL ONCE
Refills: 0 | Status: COMPLETED | OUTPATIENT
Start: 2025-01-16 | End: 2025-01-16

## 2025-01-16 RX ORDER — ACETAMINOPHEN 80 MG/.8ML
975 SOLUTION/ DROPS ORAL EVERY 6 HOURS
Refills: 0 | Status: DISCONTINUED | OUTPATIENT
Start: 2025-01-16 | End: 2025-01-17

## 2025-01-16 RX ORDER — SODIUM CHLORIDE 9 MG/ML
1000 INJECTION, SOLUTION INTRAVENOUS
Refills: 0 | Status: DISCONTINUED | OUTPATIENT
Start: 2025-01-16 | End: 2025-01-17

## 2025-01-16 RX ORDER — SODIUM CHLORIDE 9 MG/ML
1000 INJECTION, SOLUTION INTRAVENOUS ONCE
Refills: 0 | Status: COMPLETED | OUTPATIENT
Start: 2025-01-16 | End: 2025-01-16

## 2025-01-16 RX ORDER — BENZOCAINE AND MENTHOL 15; 3.6 MG/1; MG/1
1 LOZENGE ORAL
Refills: 0 | Status: DISCONTINUED | OUTPATIENT
Start: 2025-01-16 | End: 2025-01-17

## 2025-01-16 RX ORDER — GUAIFENESIN 100 MG/5ML
200 SYRUP ORAL EVERY 6 HOURS
Refills: 0 | Status: DISCONTINUED | OUTPATIENT
Start: 2025-01-16 | End: 2025-01-17

## 2025-01-16 RX ORDER — AMPICILLIN SODIUM AND SULBACTAM SODIUM 100; 50 MG/ML; MG/ML
INJECTION, POWDER, FOR SOLUTION INTRAVENOUS
Refills: 0 | Status: DISCONTINUED | OUTPATIENT
Start: 2025-01-16 | End: 2025-01-17

## 2025-01-16 RX ORDER — BENZOCAINE AND MENTHOL 15; 3.6 MG/1; MG/1
2 LOZENGE ORAL
Refills: 0 | Status: DISCONTINUED | OUTPATIENT
Start: 2025-01-16 | End: 2025-01-16

## 2025-01-16 RX ORDER — AMPICILLIN SODIUM AND SULBACTAM SODIUM 100; 50 MG/ML; MG/ML
3 INJECTION, POWDER, FOR SOLUTION INTRAVENOUS ONCE
Refills: 0 | Status: COMPLETED | OUTPATIENT
Start: 2025-01-16 | End: 2025-01-16

## 2025-01-16 RX ORDER — OSELTAMIVIR 75 MG/1
75 CAPSULE ORAL
Refills: 0 | Status: DISCONTINUED | OUTPATIENT
Start: 2025-01-16 | End: 2025-01-16

## 2025-01-16 RX ORDER — GUAIFENESIN 100 MG/5ML
600 SYRUP ORAL EVERY 12 HOURS
Refills: 0 | Status: DISCONTINUED | OUTPATIENT
Start: 2025-01-16 | End: 2025-01-17

## 2025-01-16 RX ORDER — AMPICILLIN SODIUM AND SULBACTAM SODIUM 100; 50 MG/ML; MG/ML
3 INJECTION, POWDER, FOR SOLUTION INTRAVENOUS EVERY 6 HOURS
Refills: 0 | Status: DISCONTINUED | OUTPATIENT
Start: 2025-01-16 | End: 2025-01-17

## 2025-01-16 RX ORDER — SODIUM CHLORIDE 9 MG/ML
500 INJECTION, SOLUTION INTRAVENOUS ONCE
Refills: 0 | Status: COMPLETED | OUTPATIENT
Start: 2025-01-16 | End: 2025-01-16

## 2025-01-16 RX ORDER — PNV/FERROUS FUM/DOCUSATE/FOLIC 90-50-1MG
1 TABLET, EXTENDED RELEASE ORAL DAILY
Refills: 0 | Status: DISCONTINUED | OUTPATIENT
Start: 2025-01-16 | End: 2025-01-17

## 2025-01-16 RX ORDER — AZITHROMYCIN MONOHYDRATE 200 MG/5ML
500 POWDER, FOR SUSPENSION ORAL EVERY 24 HOURS
Refills: 0 | Status: DISCONTINUED | OUTPATIENT
Start: 2025-01-16 | End: 2025-01-17

## 2025-01-16 RX ADMIN — POTASSIUM CHLORIDE 20 MILLIEQUIVALENT(S): 600 TABLET, FILM COATED, EXTENDED RELEASE ORAL at 16:30

## 2025-01-16 RX ADMIN — Medication 1 TABLET(S): at 11:31

## 2025-01-16 RX ADMIN — AMPICILLIN SODIUM AND SULBACTAM SODIUM 200 GRAM(S): 100; 50 INJECTION, POWDER, FOR SOLUTION INTRAVENOUS at 17:14

## 2025-01-16 RX ADMIN — ACETAMINOPHEN 400 MILLIGRAM(S): 80 SOLUTION/ DROPS ORAL at 06:13

## 2025-01-16 RX ADMIN — POTASSIUM CHLORIDE 20 MILLIEQUIVALENT(S): 600 TABLET, FILM COATED, EXTENDED RELEASE ORAL at 22:08

## 2025-01-16 RX ADMIN — SODIUM CHLORIDE 1000 MILLILITER(S): 9 INJECTION, SOLUTION INTRAVENOUS at 02:20

## 2025-01-16 RX ADMIN — Medication 600 MILLIGRAM(S): at 11:31

## 2025-01-16 RX ADMIN — SODIUM CHLORIDE 125 MILLILITER(S): 9 INJECTION, SOLUTION INTRAVENOUS at 10:12

## 2025-01-16 RX ADMIN — POTASSIUM CHLORIDE 20 MILLIEQUIVALENT(S): 600 TABLET, FILM COATED, EXTENDED RELEASE ORAL at 11:31

## 2025-01-16 RX ADMIN — ACETAMINOPHEN 400 MILLIGRAM(S): 80 SOLUTION/ DROPS ORAL at 11:32

## 2025-01-16 RX ADMIN — SODIUM CHLORIDE 1000 MILLILITER(S): 9 INJECTION, SOLUTION INTRAVENOUS at 10:26

## 2025-01-16 RX ADMIN — AMPICILLIN SODIUM AND SULBACTAM SODIUM 200 GRAM(S): 100; 50 INJECTION, POWDER, FOR SOLUTION INTRAVENOUS at 11:32

## 2025-01-16 RX ADMIN — Medication 200 MILLIGRAM(S): at 17:03

## 2025-01-16 RX ADMIN — ACETAMINOPHEN 1000 MILLIGRAM(S): 80 SOLUTION/ DROPS ORAL at 06:46

## 2025-01-16 RX ADMIN — AZITHROMYCIN MONOHYDRATE 255 MILLIGRAM(S): 200 POWDER, FOR SUSPENSION ORAL at 03:50

## 2025-01-16 RX ADMIN — LEVOTHYROXINE SODIUM 75 MICROGRAM(S): 175 TABLET ORAL at 06:13

## 2025-01-16 RX ADMIN — AMPICILLIN SODIUM AND SULBACTAM SODIUM 200 GRAM(S): 100; 50 INJECTION, POWDER, FOR SOLUTION INTRAVENOUS at 06:53

## 2025-01-16 RX ADMIN — AMPICILLIN SODIUM AND SULBACTAM SODIUM 200 GRAM(S): 100; 50 INJECTION, POWDER, FOR SOLUTION INTRAVENOUS at 03:14

## 2025-01-16 RX ADMIN — ACETAMINOPHEN 400 MILLIGRAM(S): 80 SOLUTION/ DROPS ORAL at 20:08

## 2025-01-16 NOTE — CONSULT NOTE ADULT - PROBLEM SELECTOR RECOMMENDATION 4
-25w1d GA  -Bedside sono shows transverse presentation, anterior placenta, MVP >2cm, gross fetal movements.   -FHT reassuring  -No contractions on toco.  -Continue with prenatal vitamins  -NST BID Anemia in pregnancy is defined as hemoglobin less than 11g/dL in the first trimester, less than 10.5 g/dL in the second trimester and less than 11g/dL in the third trimester. Hemoglobin this morning was 10.5. Iron deficiency anemia is the most common cause which is best treated by oral or in some cases IV iron replacement (though would not give IV iron at this time given concern for active baterial infection).  Failure to correct severe iron deficiency anemia can result in IUGR or  birth.  Will order iron studies, B12 and folate to assess for cause of anemia now, and recommend hemoglobin reassessment in the 3rd trimester.

## 2025-01-16 NOTE — CONSULT NOTE ADULT - NSCONSULTADDITIONALINFOA_GEN_ALL_CORE
MFM Fellow Addendum:  I have personally seen and examined the patient.  I fully participated in the care of this patient.  I have made amendments to the documentation where necessary, and agree with the history, physical exam, and plan as documented by the resident.   Patient seen and discussed with [facility] attending Dr. Hammonds.    Lakisha Jensen MD  M Fellow

## 2025-01-16 NOTE — CONSULT NOTE ADULT - SUBJECTIVE AND OBJECTIVE BOX
34y  at 25w1d GA who presents to L&D for sudden onset fevers and chills today. Patient reports waking up from a nap with chills. At that time she took her temperature and noted it to be 103.7F. Patient took 1000mg of Tylenol at that time. Reports SOB and intermittent chest tightness. Patient reports having a cough for 2 days. Reports son had RSV 2 weeks ago. Patient also reports decreased fetal movements today which are now improving. Patient denies vaginal bleeding, contractions and leakage of fluid.  Denies fevers, chills, nausea, vomiting, chest pain, SOB, dizziness and headache. No other complaints at this time.     SONA: 2025  LMP: 2024    Prenatal course uncomplicated.      POB: NSVDx1, uncomplicated  PGYN: -fibroids, -ovarian cysts, denies STD hx, denies abnormal PAPs   PMH: hypothyroidism, childhood asthma, migraines  PSH: LEEP, left knee surgery  SH: Denies EtOH, tobacco and illicit drug use during this pregnancy; feels safe at home   Meds: PNVs, synthroid 75mcg, PNVs  Allergies: Cipro, Ceclor (hives)    REVIEW OF SYSTEMS:    CONSTITUTIONAL: No weakness, fevers or chills  EYES/ENT: No visual changes;  No vertigo or throat pain   NECK: No pain or stiffness  RESPIRATORY: No cough, wheezing, hemoptysis; No shortness of breath  CARDIOVASCULAR: No chest pain or palpitations  GASTROINTESTINAL: No abdominal or epigastric pain. No nausea, vomiting, or hematemesis; No diarrhea or constipation. No melena or hematochezia.  GENITOURINARY: No dysuria, frequency or hematuria  NEUROLOGICAL: No numbness or weakness  SKIN: No itching, burning, rashes, or lesions   All other review of systems is negative unless indicated above.    Vital Signs:  Vital Signs Last 24 Hrs  T(C): 37.1 (2025 04:00), Max: 39.3 (2025 00:15)  T(F): 98.8 (2025 04:00), Max: 102.7 (2025 00:15)  HR: 95 (2025 04:00) (95 - 120)  BP: 103/64 (2025 04:00) (99/50 - 129/58)  RR: 18 (2025 04:00) (18 - 18)  SpO2: 96% (2025 04:00) (94% - 96%)    Parameters below as of 2025 04:00  Patient On (Oxygen Delivery Method): room air      Height (cm): 170.2 (25 @ 00:15)  Weight (kg): 80.7 (25 @ 00:15)  BMI (kg/m2): 27.9 (25 @ 00:15)  BSA (m2): 1.92 (25 @ 00:15)    Physical Exam:  General: Adult female in NAD  Head/Neck: No neck masses, no lymphadenopathy  CVS: RRR, +S1/S2, no murmurs  Lungs: CTAB, no wheezing, rhonchi or rales  Abdomen: soft, non-tender, gravid uterus  Pelvic: Deferred  Ext: No cyanosis, edema or calf tenderness  Skin: No rashes or lesions on exposed skin  Neuro: Normal DTRs, grossly intact    Labs:                          10.5   7.59  )-----------( 172      ( 2025 02:09 )             30.5     01-16    134[L]  |  104  |  4.6[L]  ----------------------------<  139[H]  3.1[L]   |  19.0[L]  |  0.41[L]    Ca    7.7[L]      2025 02:09    TPro  5.4[L]  /  Alb  3.1[L]  /  TBili  0.3[L]  /  DBili  x   /  AST  14  /  ALT  9   /  AlkPhos  60  -16    PT/INR - ( 2025 02:09 )   PT: 11.3 sec;   INR: 1.00 ratio         PTT - ( 2025 02:09 )  PTT:27.7 sec        MEDICATIONS  (STANDING):  ampicillin/sulbactam  IVPB 3 Gram(s) IV Intermittent every 6 hours  ampicillin/sulbactam  IVPB      azithromycin  IVPB 500 milliGRAM(s) IV Intermittent every 24 hours  lactated ringers. 1000 milliLiter(s) (125 mL/Hr) IV Continuous <Continuous>  oseltamivir 75 milliGRAM(s) Oral two times a day  prenatal multivitamin 1 Tablet(s) Oral daily   34y  at 25w1d GA who presents to L&D for sudden onset fevers and chills today. Patient reports waking up from a nap with chills. At that time she took her temperature and noted it to be 103.7F. Patient took 1000mg of Tylenol at that time. Reports SOB and intermittent chest tightness. Patient reports having a cough for 2 days. Reports son had RSV 2 weeks ago. Patient also reports decreased fetal movements today which are now improving. Patient denies vaginal bleeding, contractions and leakage of fluid.  Denies  nausea, vomiting, chest pain, dizziness and headache. No other complaints at this time.     SONA: 2025  LMP: 2024    Prenatal course uncomplicated.      POB: NSVDx1, uncomplicated  PGYN: -fibroids, -ovarian cysts, denies STD hx, denies abnormal PAPs   PMH: hypothyroidism, childhood asthma, migraines  PSH: LEEP, left knee surgery  SH: Denies EtOH, tobacco and illicit drug use during this pregnancy; feels safe at home   Meds: PNVs, synthroid 75mcg, PNVs  Allergies: Cipro, Ceclor (hives)    REVIEW OF SYSTEMS:    CONSTITUTIONAL: No weakness, fevers or chills  EYES/ENT: No visual changes;  No vertigo or throat pain   NECK: No pain or stiffness  RESPIRATORY: No cough, wheezing, hemoptysis; No shortness of breath  CARDIOVASCULAR: No chest pain or palpitations  GASTROINTESTINAL: No abdominal or epigastric pain. No nausea, vomiting, or hematemesis; No diarrhea or constipation. No melena or hematochezia.  GENITOURINARY: No dysuria, frequency or hematuria  NEUROLOGICAL: No numbness or weakness  SKIN: No itching, burning, rashes, or lesions   All other review of systems is negative unless indicated above.    Vital Signs:  Vital Signs Last 24 Hrs  T(C): 37.1 (2025 04:00), Max: 39.3 (2025 00:15)  T(F): 98.8 (2025 04:00), Max: 102.7 (2025 00:15)  HR: 95 (2025 04:00) (95 - 120)  BP: 103/64 (2025 04:00) (99/50 - 129/58)  RR: 18 (2025 04:00) (18 - 18)  SpO2: 96% (2025 04:00) (94% - 96%)    Parameters below as of 2025 04:00  Patient On (Oxygen Delivery Method): room air      Height (cm): 170.2 (25 @ 00:15)  Weight (kg): 80.7 (25 @ 00:15)  BMI (kg/m2): 27.9 (25 @ 00:15)  BSA (m2): 1.92 (25 @ 00:15)    Physical Exam:  General: Adult female in NAD  Head/Neck: No neck masses, no lymphadenopathy  CVS: RRR, +S1/S2, no murmurs  Lungs: CTAB, no wheezing, rhonchi or rales  Abdomen: soft, non-tender, gravid uterus  Pelvic: Deferred  Ext: No cyanosis, edema or calf tenderness  Skin: No rashes or lesions on exposed skin  Neuro: Normal DTRs, grossly intact    Labs:                          10.5   7.59  )-----------( 172      ( 2025 02:09 )             30.5     -    134[L]  |  104  |  4.6[L]  ----------------------------<  139[H]  3.1[L]   |  19.0[L]  |  0.41[L]    Ca    7.7[L]      2025 02:09    TPro  5.4[L]  /  Alb  3.1[L]  /  TBili  0.3[L]  /  DBili  x   /  AST  14  /  ALT  9   /  AlkPhos  60  -    PT/INR - ( 2025 02:09 )   PT: 11.3 sec;   INR: 1.00 ratio         PTT - ( 2025 02:09 )  PTT:27.7 sec        MEDICATIONS  (STANDING):  ampicillin/sulbactam  IVPB 3 Gram(s) IV Intermittent every 6 hours  ampicillin/sulbactam  IVPB      azithromycin  IVPB 500 milliGRAM(s) IV Intermittent every 24 hours  lactated ringers. 1000 milliLiter(s) (125 mL/Hr) IV Continuous <Continuous>  oseltamivir 75 milliGRAM(s) Oral two times a day  prenatal multivitamin 1 Tablet(s) Oral daily   34y  at 25w1d GA who presents to L&D for sudden onset of fever and chills today. Patient reports waking up from a nap with chills. At that time she took her temperature and noted it to be 103.7F. Patient took 1000mg of Tylenol at that time. Reports SOB and intermittent chest tightness. Patient reports having a cough for 2 days. Reports son had RSV 2 weeks ago. Patient also reports decreased fetal movements today which are now improving. Patient denies vaginal bleeding, contractions and leakage of fluid.  Denies  nausea, vomiting, chest pain, dizziness and headache. No other complaints at this time.     SONA: 2025  LMP: 2024    Prenatal course uncomplicated.      POB: NSVDx1, uncomplicated  PGYN: -fibroids, -ovarian cysts, denies STD hx, denies abnormal PAPs   PMH: hypothyroidism, childhood asthma, migraines  PSH: LEEP, left knee surgery  SH: Denies EtOH, tobacco and illicit drug use during this pregnancy; feels safe at home   Meds: PNVs, synthroid 75mcg, PNVs  Allergies: Cipro, Ceclor (hives)    REVIEW OF SYSTEMS:    CONSTITUTIONAL: No weakness, fevers or chills  EYES/ENT: No visual changes;  No vertigo or throat pain   NECK: No pain or stiffness  RESPIRATORY: No cough, wheezing, hemoptysis; No shortness of breath  CARDIOVASCULAR: No chest pain or palpitations  GASTROINTESTINAL: No abdominal or epigastric pain. No nausea, vomiting, or hematemesis; No diarrhea or constipation. No melena or hematochezia.  GENITOURINARY: No dysuria, frequency or hematuria  NEUROLOGICAL: No numbness or weakness  SKIN: No itching, burning, rashes, or lesions   All other review of systems is negative unless indicated above.    Vital Signs:  Vital Signs Last 24 Hrs  T(C): 37.1 (2025 04:00), Max: 39.3 (2025 00:15)  T(F): 98.8 (2025 04:00), Max: 102.7 (2025 00:15)  HR: 95 (2025 04:00) (95 - 120)  BP: 103/64 (2025 04:00) (99/50 - 129/58)  RR: 18 (2025 04:00) (18 - 18)  SpO2: 96% (2025 04:00) (94% - 96%)    Parameters below as of 2025 04:00  Patient On (Oxygen Delivery Method): room air    Height (cm): 170.2 (25 @ 00:15)  Weight (kg): 80.7 (25 @ 00:15)  BMI (kg/m2): 27.9 (25 @ 00:15)  BSA (m2): 1.92 (25 @ 00:15)    Physical Exam:  General: Adult female in NAD  Head/Neck: No neck masses, no lymphadenopathy  CVS: RRR, +S1/S2, no murmurs  Lungs: CTAB, no wheezing, rhonchi or rales  Abdomen: soft, non-tender, gravid uterus  Pelvic: Deferred  Ext: No cyanosis, edema or calf tenderness  Skin: No rashes or lesions on exposed skin  Neuro: Normal DTRs, grossly intact    Labs:                         10.5   7.59  )-----------( 172      ( 2025 02:09 )             30.5     -    134[L]  |  104  |  4.6[L]  ----------------------------<  139[H]  3.1[L]   |  19.0[L]  |  0.41[L]    Ca    7.7[L]      2025 02:09    TPro  5.4[L]  /  Alb  3.1[L]  /  TBili  0.3[L]  /  DBili  x   /  AST  14  /  ALT  9   /  AlkPhos  60  -    PT/INR - ( 2025 02:09 )   PT: 11.3 sec;   INR: 1.00 ratio         PTT - ( 2025 02:09 )  PTT:27.7 sec      MEDICATIONS  (STANDING):  ampicillin/sulbactam  IVPB 3 Gram(s) IV Intermittent every 6 hours  ampicillin/sulbactam  IVPB      azithromycin  IVPB 500 milliGRAM(s) IV Intermittent every 24 hours  lactated ringers. 1000 milliLiter(s) (125 mL/Hr) IV Continuous <Continuous>  oseltamivir 75 milliGRAM(s) Oral two times a day  prenatal multivitamin 1 Tablet(s) Oral daily

## 2025-01-16 NOTE — OB PROVIDER H&P - NSHPPHYSICALEXAM_GEN_ALL_CORE
T(C): 36.9 (01-16-25 @ 01:15), Max: 39.3 (01-16-25 @ 00:15)  HR: 120 (01-16-25 @ 01:15) (106 - 120)  BP: 103/56 (01-16-25 @ 01:15) (99/50 - 129/58)  RR: 18 (01-16-25 @ 01:15) (18 - 18)  SpO2: 95% (01-16-25 @ 01:15) (94% - 95%)    Gen: NAD, well-appearing, AAOx3   CV: RRR  Pulm: clear breath sound bilaterally  Abd: Soft, gravid  Ext: non-tender, non-edematous    Bedside sono: transverse presentation, anterior placenta, MVP >2cm, gross fetal movements  FHT: NST reactive  [place]: no contractions

## 2025-01-16 NOTE — CONSULT NOTE ADULT - PROBLEM SELECTOR RECOMMENDATION 5
-25w1d GA  -Bedside sono shows transverse presentation, anterior placenta, MVP >2cm, gross fetal movements.   -FHT reassuring  -No contractions on toco.  -Continue with prenatal vitamins  -NST BID

## 2025-01-16 NOTE — CONSULT NOTE ADULT - PROBLEM SELECTOR RECOMMENDATION 2
-Patient reports fevers of 103.7F at home for which she took tylenol.  -Upon arrival patient was afebrile but then developed a fever of 102F, now s/p ofirmev.  -O2 sat 94% on RA  -CBC wnl, no leukocytosis  -RVP panel negative  -CXR suspicious for PNA in left lower lung lobe.  -Recommend blood Cx, urine Cx, Sputum Cx, and lactate.  -Recommend started Unasyn and Azithromycin for suspected PNA.  -Recommend Tylenol as needed for fevers. Patient with a history of hypothyroidism. Continue Synthroid 75 mcg. Patient follows with an outside endocrinologist. Reports she was due to check TFTs today. Will order TFTs with AM labs.

## 2025-01-16 NOTE — OB PROVIDER H&P - NSLOWPPHRISK_OBGYN_A_OB
No previous uterine incision/[place] Pregnancy/Less than or equal to 4 previous vaginal births/No known bleeding disorder/No history of postpartum hemorrhage/No other PPH risks indicated

## 2025-01-16 NOTE — OB PROVIDER H&P - ASSESSMENT
34y  at 25w1d GA who presents to L&D for sudden onset URI symptoms Admitted for fevers and observation.    A/P:  -Upon arrival patient afebrile to 98F. Patient became febrile while in triage to 102F and is now s/p Ofirmev. Temp is now 98F.  -Patient noted to be mildly tachycardic to 110s.  -O2 sat 94% on RA  -CBC wnl, no leukocytosis.  -Bedside sono shows transverse presentation, MVP >2cm, anterior placenta, gross fetal movements.  -Lungs clear on auscultation.  -Patient given one dose of tamiflu upon arrival.  -RVP now negative.  -NST reactive. No obstetrical complaints. No contractions on toco.  -Will admit patient for observation.   -CXR pending  -MFM consulted.    Discussed with Dr. Pimentel 34y  at 25w1d GA who presents to L&D for sudden onset URI symptoms Admitted for fevers and observation.    A/P:  -Upon arrival patient afebrile to 98F. Patient became febrile while in triage to 102F and is now s/p Ofirmev. Temp is now 98F.  -Patient noted to be mildly tachycardic to 110s.  -O2 sat 94% on RA  -CBC wnl, no leukocytosis.  -Bedside sono shows transverse presentation, MVP >2cm, anterior placenta, gross fetal movements.  -Lungs clear on auscultation.  -Patient given one dose of tamiflu upon arrival.  -RVP now negative.  -NST reactive. No obstetrical complaints. No contractions on toco.  -Will admit patient for observation.   -CXR pending  -MFM consulted.    Discussed with Dr. Pimentel    Addendum:    Subjective Hx and Physical Exam reviewed.  I agree with the Resident Physician's assessment and plan of care, as discussed above.  R/B/A of admission for observation, fever management (Viral infection in pregnancy vs PNA in pregnancy) discussed at length, including medications to be utilized and any indicated procedures.  She was given the opportunity to ask questions and all were addressed.  She understands the plan of care.    Ankit Pimentel, DO

## 2025-01-16 NOTE — CONSULT NOTE ADULT - PROBLEM SELECTOR RECOMMENDATION 3
Patient with a history of hypothyroidism. Continue Synthroid 75 mcg. Potassium 3.1. Will replete with PO potassium. Plan to recheck CMP in the AM.

## 2025-01-16 NOTE — OB PROVIDER H&P - HISTORY OF PRESENT ILLNESS
34y  at 25w1d GA who presents to L&D for sudden onset fevers and chills today. Patient reports waking up from a nap with chills. At that time she took her temperature and noted it to be 103.7F. Patient took 1000mg of Tylenol at that time. Reports SOB and intermittent chest tightness. Patient reports having a cough for 2 days. Reports son had RSV 2 weeks ago. Patient also reports decreased fetal movements today which are now improving. Patient denies vaginal bleeding, contractions and leakage of fluid.  Denies fevers, chills, nausea, vomiting, chest pain, SOB, dizziness and headache. No other complaints at this time.     Prenatal course uncomplicated.      POB: NSVDx1, uncomplicated  PGYN: -fibroids, -ovarian cysts, denies STD hx, denies abnormal PAPs   PMH: hypothyroidism, childhood asthma, migraines  PSH: LEEP, left knee surgery  SH: Denies EtOH, tobacco and illicit drug use during this pregnancy; feels safe at home   Meds: PNVs, synthroid 75mcg, PNVs  Allergies: Cipro, Ceclor (hives)

## 2025-01-16 NOTE — CONSULT NOTE ADULT - ASSESSMENT
34y  at 25w1d GA admitted for suspected viral URI vs PNA. 34y  at 25w1d GA admitted for suspected pneumonia. 34y  at 25w1d GA.  She has community acquired pneumonia during pregnancy.

## 2025-01-16 NOTE — OB RN PATIENT PROFILE - FALL HARM RISK - UNIVERSAL INTERVENTIONS
Bed in lowest position, wheels locked, appropriate side rails in place/Call bell, personal items and telephone in reach/Instruct patient to call for assistance before getting out of bed or chair/Non-slip footwear when patient is out of bed/[employer] to call system/Physically safe environment - no spills, clutter or unnecessary equipment/Purposeful Proactive Rounding/Room/bathroom lighting operational, light cord in reach

## 2025-01-16 NOTE — CONSULT NOTE ADULT - PROBLEM SELECTOR RECOMMENDATION 9
-25w1d GA  -Bedside sono shows transverse presentation, anterior placenta, MVP >2cm, gross fetal movements.   -FHT reassuring  -No contractions on toco.  -Continue with prenatal vitamins  -NST BID Patient presenting with fever at home of 103, chills, cough, SOB. Upon arrival patient was afebrile but then developed a fever of 102.7F, now improved s/p ofirmev. Tachycardic to the 110s with O2 sats 94-95%. WBC WNL, lactate WNL. RVP negative, cultures pending, and CXR suspicious for pneumonia in the left lower lobe.    Streptococcus pneumoniae and Haemophilus influenzae are the most common identifiable causes of pneumonia in pregnancy. Mycoplasma pneumoniae is the most common cause of atypical pneumonia.  Hospital admission for all pregnant women with pneumonia is recommended. Recommend antibiotic treatemnt with unasyn (in place of ceftriaxone given her past ceclor allergy) and azithromycin. The regimen can be narrowed based on sputum gram stain/culture results. When the patient is afebrile for 48 hours an oral antibiotics be started and IV therapy discontinued. Recommend tylenol as needed for fevers. Mucinex ordered as needed for cough.

## 2025-01-17 ENCOUNTER — TRANSCRIPTION ENCOUNTER (OUTPATIENT)
Age: 35
End: 2025-01-17

## 2025-01-17 VITALS
HEART RATE: 101 BPM | OXYGEN SATURATION: 95 % | SYSTOLIC BLOOD PRESSURE: 98 MMHG | TEMPERATURE: 99 F | DIASTOLIC BLOOD PRESSURE: 59 MMHG | RESPIRATION RATE: 18 BRPM

## 2025-01-17 LAB
ALBUMIN SERPL ELPH-MCNC: 2.8 G/DL — LOW (ref 3.3–5.2)
ALP SERPL-CCNC: 63 U/L — SIGNIFICANT CHANGE UP (ref 40–120)
ALT FLD-CCNC: 10 U/L — SIGNIFICANT CHANGE UP
ANION GAP SERPL CALC-SCNC: 13 MMOL/L — SIGNIFICANT CHANGE UP (ref 5–17)
AST SERPL-CCNC: 18 U/L — SIGNIFICANT CHANGE UP
BILIRUB SERPL-MCNC: 0.3 MG/DL — LOW (ref 0.4–2)
BUN SERPL-MCNC: 3.8 MG/DL — LOW (ref 8–20)
CALCIUM SERPL-MCNC: 7.7 MG/DL — LOW (ref 8.4–10.5)
CHLORIDE SERPL-SCNC: 107 MMOL/L — SIGNIFICANT CHANGE UP (ref 96–108)
CO2 SERPL-SCNC: 18 MMOL/L — LOW (ref 22–29)
CREAT SERPL-MCNC: 0.41 MG/DL — LOW (ref 0.5–1.3)
CULTURE RESULTS: ABNORMAL
EGFR: 132 ML/MIN/1.73M2 — SIGNIFICANT CHANGE UP
FERRITIN SERPL-MCNC: 75 NG/ML — SIGNIFICANT CHANGE UP (ref 15–150)
FOLATE SERPL-MCNC: 18.6 NG/ML — SIGNIFICANT CHANGE UP
GLUCOSE SERPL-MCNC: 82 MG/DL — SIGNIFICANT CHANGE UP (ref 70–99)
HCT VFR BLD CALC: 31.4 % — LOW (ref 34.5–45)
HGB BLD-MCNC: 10.4 G/DL — LOW (ref 11.5–15.5)
IRON SATN MFR SERPL: 21 UG/DL — LOW (ref 37–145)
IRON SATN MFR SERPL: 7 % — LOW (ref 14–50)
MAGNESIUM SERPL-MCNC: 1.6 MG/DL — SIGNIFICANT CHANGE UP (ref 1.6–2.6)
MCHC RBC-ENTMCNC: 31.5 PG — SIGNIFICANT CHANGE UP (ref 27–34)
MCHC RBC-ENTMCNC: 33.1 G/DL — SIGNIFICANT CHANGE UP (ref 32–36)
MCV RBC AUTO: 95.2 FL — SIGNIFICANT CHANGE UP (ref 80–100)
PHOSPHATE SERPL-MCNC: 2.9 MG/DL — SIGNIFICANT CHANGE UP (ref 2.4–4.7)
PLATELET # BLD AUTO: 136 K/UL — LOW (ref 150–400)
POTASSIUM SERPL-MCNC: 4 MMOL/L — SIGNIFICANT CHANGE UP (ref 3.5–5.3)
POTASSIUM SERPL-SCNC: 4 MMOL/L — SIGNIFICANT CHANGE UP (ref 3.5–5.3)
PROT SERPL-MCNC: 5.4 G/DL — LOW (ref 6.6–8.7)
RBC # BLD: 3.3 M/UL — LOW (ref 3.8–5.2)
RBC # FLD: 12.8 % — SIGNIFICANT CHANGE UP (ref 10.3–14.5)
SODIUM SERPL-SCNC: 138 MMOL/L — SIGNIFICANT CHANGE UP (ref 135–145)
SPECIMEN SOURCE: SIGNIFICANT CHANGE UP
T4 FREE SERPL-MCNC: 1 NG/DL — SIGNIFICANT CHANGE UP (ref 0.9–1.7)
TIBC SERPL-MCNC: 307 UG/DL — SIGNIFICANT CHANGE UP (ref 220–430)
TRANSFERRIN SERPL-MCNC: 215 MG/DL — SIGNIFICANT CHANGE UP (ref 192–382)
TSH SERPL-MCNC: 1.16 UIU/ML — SIGNIFICANT CHANGE UP (ref 0.27–4.2)
VIT B12 SERPL-MCNC: 266 PG/ML — SIGNIFICANT CHANGE UP (ref 232–1245)
WBC # BLD: 7.42 K/UL — SIGNIFICANT CHANGE UP (ref 3.8–10.5)
WBC # FLD AUTO: 7.42 K/UL — SIGNIFICANT CHANGE UP (ref 3.8–10.5)

## 2025-01-17 PROCEDURE — 84443 ASSAY THYROID STIM HORMONE: CPT

## 2025-01-17 PROCEDURE — 83605 ASSAY OF LACTIC ACID: CPT

## 2025-01-17 PROCEDURE — 84145 PROCALCITONIN (PCT): CPT

## 2025-01-17 PROCEDURE — 83735 ASSAY OF MAGNESIUM: CPT

## 2025-01-17 PROCEDURE — 99232 SBSQ HOSP IP/OBS MODERATE 35: CPT | Mod: GC

## 2025-01-17 PROCEDURE — 85730 THROMBOPLASTIN TIME PARTIAL: CPT

## 2025-01-17 PROCEDURE — 87070 CULTURE OTHR SPECIMN AEROBIC: CPT

## 2025-01-17 PROCEDURE — 85610 PROTHROMBIN TIME: CPT

## 2025-01-17 PROCEDURE — 85025 COMPLETE CBC W/AUTO DIFF WBC: CPT

## 2025-01-17 PROCEDURE — 0225U NFCT DS DNA&RNA 21 SARSCOV2: CPT

## 2025-01-17 PROCEDURE — 83540 ASSAY OF IRON: CPT

## 2025-01-17 PROCEDURE — 87205 SMEAR GRAM STAIN: CPT

## 2025-01-17 PROCEDURE — 82607 VITAMIN B-12: CPT

## 2025-01-17 PROCEDURE — 87040 BLOOD CULTURE FOR BACTERIA: CPT

## 2025-01-17 PROCEDURE — 36415 COLL VENOUS BLD VENIPUNCTURE: CPT

## 2025-01-17 PROCEDURE — 84100 ASSAY OF PHOSPHORUS: CPT

## 2025-01-17 PROCEDURE — 83550 IRON BINDING TEST: CPT

## 2025-01-17 PROCEDURE — 82728 ASSAY OF FERRITIN: CPT

## 2025-01-17 PROCEDURE — 85027 COMPLETE CBC AUTOMATED: CPT

## 2025-01-17 PROCEDURE — 71045 X-RAY EXAM CHEST 1 VIEW: CPT

## 2025-01-17 PROCEDURE — 82746 ASSAY OF FOLIC ACID SERUM: CPT

## 2025-01-17 PROCEDURE — 84466 ASSAY OF TRANSFERRIN: CPT

## 2025-01-17 PROCEDURE — 81003 URINALYSIS AUTO W/O SCOPE: CPT

## 2025-01-17 PROCEDURE — 84439 ASSAY OF FREE THYROXINE: CPT

## 2025-01-17 PROCEDURE — 80053 COMPREHEN METABOLIC PANEL: CPT

## 2025-01-17 RX ORDER — ACETAMINOPHEN 80 MG/.8ML
3 SOLUTION/ DROPS ORAL
Qty: 0 | Refills: 0 | DISCHARGE
Start: 2025-01-17

## 2025-01-17 RX ORDER — AZITHROMYCIN MONOHYDRATE 200 MG/5ML
1 POWDER, FOR SUSPENSION ORAL
Qty: 4 | Refills: 0
Start: 2025-01-17 | End: 2025-01-20

## 2025-01-17 RX ORDER — PNV/FERROUS FUM/DOCUSATE/FOLIC 90-50-1MG
1 TABLET, EXTENDED RELEASE ORAL
Qty: 0 | Refills: 0 | DISCHARGE
Start: 2025-01-17

## 2025-01-17 RX ORDER — AZITHROMYCIN MONOHYDRATE 200 MG/5ML
1 POWDER, FOR SUSPENSION ORAL
Qty: 1 | Refills: 0
Start: 2025-01-17 | End: 2025-01-17

## 2025-01-17 RX ADMIN — BENZOCAINE AND MENTHOL 1 LOZENGE: 15; 3.6 LOZENGE ORAL at 06:20

## 2025-01-17 RX ADMIN — AMPICILLIN SODIUM AND SULBACTAM SODIUM 200 GRAM(S): 100; 50 INJECTION, POWDER, FOR SOLUTION INTRAVENOUS at 11:36

## 2025-01-17 RX ADMIN — AMPICILLIN SODIUM AND SULBACTAM SODIUM 200 GRAM(S): 100; 50 INJECTION, POWDER, FOR SOLUTION INTRAVENOUS at 00:04

## 2025-01-17 RX ADMIN — AZITHROMYCIN MONOHYDRATE 255 MILLIGRAM(S): 200 POWDER, FOR SUSPENSION ORAL at 03:02

## 2025-01-17 RX ADMIN — LEVOTHYROXINE SODIUM 75 MICROGRAM(S): 175 TABLET ORAL at 06:20

## 2025-01-17 RX ADMIN — AMPICILLIN SODIUM AND SULBACTAM SODIUM 200 GRAM(S): 100; 50 INJECTION, POWDER, FOR SOLUTION INTRAVENOUS at 06:33

## 2025-01-17 RX ADMIN — BENZOCAINE AND MENTHOL 1 LOZENGE: 15; 3.6 LOZENGE ORAL at 00:05

## 2025-01-17 NOTE — PROGRESS NOTE ADULT - PROBLEM SELECTOR PLAN 1
-Patient had presented with fever, chills, cough, SOB  -Last fever yesterday 1/16 @0015  -RVP negative, cultures pending, and CXR suspicious for pneumonia in the left lower lobe  -HR now 90sbpm, O2 sat % on RA  -WBC 7.59, no leukocytosis  -On mucinex, robitussin, cough drops, and tylenol prn symptomatic care  -Continue antibiotics: Unasyn and azitrhomycin. Pending sputum gram stain/culture results for sensitivities   -When the patient is afebrile for 48 hours an oral antibiotics be started and IV therapy discontinued  -PO hydration, regular diet -Patient had presented with fever, chills, cough, SOB  -Last fever yesterday 1/16 @0015  -RVP negative, cultures pending, and CXR suspicious for pneumonia in the left lower lobe  -HR now 90sbpm, O2 sat % on RA  -WBC 7.59, no leukocytosis  -On mucinex, robitussin, cough drops, and tylenol prn symptomatic care  -Continue antibiotics: Unasyn and azithromycin. Pending sputum gram stain/culture results for sensitivities   -When the patient is afebrile for 48 hours an oral antibiotics be started and IV therapy discontinued  -PO hydration, regular diet

## 2025-01-17 NOTE — DISCHARGE NOTE OB - CARE PLAN
1 Principal Discharge DX:	Pneumonia affecting pregnancy in second trimester  Assessment and plan of treatment:	You were admitted and treated in the hospital for community acquired antibiotics. You received IV antibiotics, tylenol, mucinex, robitussin, and cepacol as needed for symptomatic care. Upon discharge, vital signs stable, afebrile. Labs within normal limits. Continue taking tylenol, mucinex, robitussin, and cough drops as needed for symptom management. Continue to stay hydrated. Call your doctor and return to hospital if your fever returns or worsens or if you have difficulty breathing.  Secondary Diagnosis:	25 weeks gestation of pregnancy  Assessment and plan of treatment:	Continue daily prenatal vitamin. Continue daily levothyroxine medication. See your OBGYN Dr. Pimentel as scheduled for your routine OB prenatal care. Call your doctor or return to hospital if you experience decreased fetal movement, vaginal bleeding, leakage of fluid, or contractions.

## 2025-01-17 NOTE — DISCHARGE NOTE OB - FINANCIAL ASSISTANCE
[facility] provides services at a reduced cost to those who are determined to be eligible through [facility]’s financial assistance program. Information regarding [facility]’s financial assistance program can be found by going to [email or web address] or by calling [phone].

## 2025-01-17 NOTE — PROGRESS NOTE ADULT - PROBLEM SELECTOR PLAN 5
-25w2d GA  -Bedside sono shows transverse presentation, anterior placenta, MVP >2cm, gross fetal movements.   -FHT reassuring  -No contractions on toco  -Continue with prenatal vitamins daily  -NST BID > consider NST QD

## 2025-01-17 NOTE — PROGRESS NOTE ADULT - ATTENDING COMMENTS
MFM Attending    34 year old  at 25 2/7 weeks admitted with likely community acquired pneumonia in pregnancy. Receiving antibiotics. Now afebrile. Clinically improved, O2sats normal on room air. Culture results pending. Fetal status reassuring. d/c home with close followup.    SASHA Lopez

## 2025-01-17 NOTE — PROGRESS NOTE ADULT - PROBLEM SELECTOR PLAN 4
Anemia in pregnancy is defined as hemoglobin less than 11g/dL in the first trimester, less than 10.5 g/dL in the second trimester and less than 11g/dL in the third trimester  -Hgb on admission 10.5  -Iron studies, B12 and folate ordered with AM labs to assess for cause of anemia  -F/u labs. Consider starting po iron

## 2025-01-17 NOTE — PROGRESS NOTE ADULT - SUBJECTIVE AND OBJECTIVE BOX
34y  at 25w2d GA admitted to antepartum service for management of likely community acquired pneumonia.     S: No acute events overnight. Patient's symptoms are somewhat improved since yesterday. She continues to report a productive cough. She thinks her fever broke last night, as she experienced sweats, chills, and body aches which have since resolved. Afebrile overnight. She last recieved tylenol at 1900 yesterday evening. The mucinex, robitussin, and cough drops are somewhat improving her symptoms. She denies fever, chest pain, or shortness of breath. She has a productive cough but denies difficulty breathing. Declined nebulizer treatment last night as suggested by Dr. Tolentino. She has no OB complaints. Denies contractions, vaginal bleeding, leakage of fluid. She reports good fetal movement.     Vital Signs:  T(C): 37.1 (2025 04:00), Max: 37.3 (2025 12:00)  T(F): 98.7 (2025 04:00), Max: 99.1 (2025 12:00)  HR: 99 (2025 04:00) (91 - 109)  BP: 99/59 (2025 04:00) (94/51 - 105/60)  RR: 18 (2025 04:00) (18 - 18)  SpO2: 95% (2025 04:00) (93% - 97%)    Physical Exam:  General: Adult female in NAD, AAO x3  CVS: RRR, +S1/S2, no murmurs  Lungs: Mild rhonchi noted, cleared by coughing. Otherwise CTAB, no wheezing or rales noted.  Abdomen: soft, non-tender, gravid uterus  Ext: No cyanosis, edema or calf tenderness    Labs:                             10.5   7.59  )-----------( 172      ( 2025 02:09 )             30.5     01-16    134[L]  |  104  |  4.6[L]  ----------------------------<  139[H]  3.1[L]   |  19.0[L]  |  0.41[L]    Ca    7.7[L]      2025 02:09    TPro  5.4[L]  /  Alb  3.1[L]  /  TBili  0.3[L]  /  DBili  x   /  AST  14  /  ALT  9   /  AlkPhos  60      PT/INR - ( 2025 02:09 )   PT: 11.3 sec;   INR: 1.00 ratio         PTT - ( 2025 02:09 )  PTT:27.7 sec      MEDICATIONS  (STANDING):  ampicillin/sulbactam  IVPB 3 Gram(s) IV Intermittent every 6 hours  ampicillin/sulbactam  IVPB      azithromycin  IVPB 500 milliGRAM(s) IV Intermittent every 24 hours  lactated ringers. 1000 milliLiter(s) (125 mL/Hr) IV Continuous <Continuous>  oseltamivir 75 milliGRAM(s) Oral two times a day  prenatal multivitamin 1 Tablet(s) Oral daily

## 2025-01-17 NOTE — DISCHARGE NOTE OB - MEDICATION SUMMARY - MEDICATIONS TO TAKE
I will START or STAY ON the medications listed below when I get home from the hospital:    acetaminophen 325 mg oral tablet  -- 3 tab(s) by mouth every 6 hours As needed Temp greater or equal to 38C (100.4F), Moderate Pain (4 - 6)  -- Indication: For Pain, fever, URI symptoms    albuterol 90 mcg/inh inhalation aerosol  -- inhaled 2 times a day  -- Indication: For Asthma    Prenatal Multivitamins with Folic Acid 1 mg oral tablet  -- 1 tab(s) by mouth once a day  -- Indication: For Prenatal supplement    azithromycin 500 mg oral tablet  -- 1 tab(s) by mouth once a day  -- Indication: For Pneumonia affecting pregnancy in second trimester    azithromycin 250 mg oral tablet  -- 1 tab(s) by mouth once a day  -- Indication: For Pneumonia affecting pregnancy in second trimester    Synthroid 75 mcg (0.075 mg) oral tablet  -- 1 tab(s) by mouth once a day  -- Indication: For Hypothyroidism

## 2025-01-17 NOTE — DISCHARGE NOTE OB - PATIENT PORTAL LINK FT
You can access the FollowMyHealth Patient Portal offered by [facility] by registering at the following website: [email or web address]. By joining [facility]’s FollowMyHealth portal, you will also be able to view your health information using other applications (apps) compatible with our system.

## 2025-01-17 NOTE — PROGRESS NOTE ADULT - ASSESSMENT
34y  at 25w2d GA admitted to antepartum service for management of likely community acquired pneumonia in pregnancy.     Plan per below    To be d/w Dr. Lopez

## 2025-01-17 NOTE — DISCHARGE NOTE OB - PLAN OF CARE
Continue daily prenatal vitamin. Continue daily levothyroxine medication. See your OBGYN Dr. Pimentel as scheduled for your routine OB prenatal care. Call your doctor or return to hospital if you experience decreased fetal movement, vaginal bleeding, leakage of fluid, or contractions. You were admitted and treated in the hospital for community acquired antibiotics. You received IV antibiotics, tylenol, mucinex, robitussin, and cepacol as needed for symptomatic care. Upon discharge, vital signs stable, afebrile. Labs within normal limits. Continue taking tylenol, mucinex, robitussin, and cough drops as needed for symptom management. Continue to stay hydrated. Call your doctor and return to hospital if your fever returns or worsens or if you have difficulty breathing.

## 2025-01-17 NOTE — DISCHARGE NOTE OB - CARE PROVIDER_API CALL
Ankit Pimentel  Obstetrics and Gynecology  [address], [address]  [place] [address]  Phone: [phone]  Fax: [phone]  Established Patient  Follow Up Time: 1 week

## 2025-01-17 NOTE — DISCHARGE NOTE OB - WEAR SUPPORTIVE BRA
Writer called and spoke with patient.  Patient was busy at the moment, would like a call back on Friday.  Writer will postpone message.    Statement Selected

## 2025-01-17 NOTE — PROGRESS NOTE ADULT - PROBLEM SELECTOR PLAN 2
-Patient with a history of hypothyroidism  -Continue Synthroid 75 mcg  -Patient follows with an outside endocrinologist. Reports she was due to check TFTs  -TFTs ordered with AM labs, pending

## 2025-01-17 NOTE — DISCHARGE NOTE OB - HOSPITAL COURSE
34y  at 25w2d GA admitted for suspected community-acquired pneumonia (CAP) in pregnancy. During her hospital course she received IV antibiotics Unasyn and azithromycin. She received tylenol, mucinex, robitussin, and cepacol cough drops as needed for symptomatic care. Upon discharge, vital signs stable, afebrile for greater than 24 hours. Her symptoms have improved. She is ambulating, tolerating PO without nausea or vomiting, and voiding well on her own. Patient to see her OBGYN outpatient for routine OB care. Return precautions provided.

## 2025-01-21 LAB
CULTURE RESULTS: SIGNIFICANT CHANGE UP
CULTURE RESULTS: SIGNIFICANT CHANGE UP
SPECIMEN SOURCE: SIGNIFICANT CHANGE UP
SPECIMEN SOURCE: SIGNIFICANT CHANGE UP

## 2025-01-28 ENCOUNTER — APPOINTMENT (OUTPATIENT)
Dept: OBGYN | Facility: CLINIC | Age: 35
End: 2025-01-28
Payer: COMMERCIAL

## 2025-01-28 VITALS
BODY MASS INDEX: 27.15 KG/M2 | WEIGHT: 173 LBS | SYSTOLIC BLOOD PRESSURE: 113 MMHG | HEIGHT: 67 IN | DIASTOLIC BLOOD PRESSURE: 68 MMHG

## 2025-01-28 DIAGNOSIS — Z34.92 ENCOUNTER FOR SUPERVISION OF NORMAL PREGNANCY, UNSPECIFIED, SECOND TRIMESTER: ICD-10-CM

## 2025-01-28 DIAGNOSIS — O99.280 ENDOCRINE, NUTRITIONAL AND METABOLIC DISEASES COMPLICATING PREGNANCY, UNSPECIFIED TRIMESTER: ICD-10-CM

## 2025-01-28 DIAGNOSIS — E03.9 ENDOCRINE, NUTRITIONAL AND METABOLIC DISEASES COMPLICATING PREGNANCY, UNSPECIFIED TRIMESTER: ICD-10-CM

## 2025-01-28 PROCEDURE — 0502F SUBSEQUENT PRENATAL CARE: CPT

## 2025-02-07 ENCOUNTER — ASOB RESULT (OUTPATIENT)
Age: 35
End: 2025-02-07

## 2025-02-07 ENCOUNTER — APPOINTMENT (OUTPATIENT)
Dept: ANTEPARTUM | Facility: CLINIC | Age: 35
End: 2025-02-07
Payer: COMMERCIAL

## 2025-02-07 PROCEDURE — 76817 TRANSVAGINAL US OBSTETRIC: CPT

## 2025-02-07 PROCEDURE — 76816 OB US FOLLOW-UP PER FETUS: CPT

## 2025-02-11 ENCOUNTER — APPOINTMENT (OUTPATIENT)
Dept: OBGYN | Facility: CLINIC | Age: 35
End: 2025-02-11
Payer: COMMERCIAL

## 2025-02-11 VITALS
WEIGHT: 175 LBS | SYSTOLIC BLOOD PRESSURE: 117 MMHG | DIASTOLIC BLOOD PRESSURE: 72 MMHG | HEIGHT: 67 IN | BODY MASS INDEX: 27.47 KG/M2

## 2025-02-11 DIAGNOSIS — O99.810 ABNORMAL GLUCOSE COMPLICATING PREGNANCY: ICD-10-CM

## 2025-02-11 DIAGNOSIS — Z23 ENCOUNTER FOR IMMUNIZATION: ICD-10-CM

## 2025-02-11 DIAGNOSIS — Z34.92 ENCOUNTER FOR SUPERVISION OF NORMAL PREGNANCY, UNSPECIFIED, SECOND TRIMESTER: ICD-10-CM

## 2025-02-11 DIAGNOSIS — Z34.93 ENCOUNTER FOR SUPERVISION OF NORMAL PREGNANCY, UNSPECIFIED, THIRD TRIMESTER: ICD-10-CM

## 2025-02-11 PROCEDURE — 0502F SUBSEQUENT PRENATAL CARE: CPT

## 2025-02-11 PROCEDURE — 90715 TDAP VACCINE 7 YRS/> IM: CPT

## 2025-02-11 PROCEDURE — 90471 IMMUNIZATION ADMIN: CPT

## 2025-02-12 PROBLEM — Z23 ENCOUNTER FOR IMMUNIZATION: Status: ACTIVE | Noted: 2025-02-11 | Resolved: 2025-02-25

## 2025-02-16 LAB
ESTIMATED AVERAGE GLUCOSE: 100 MG/DL
HBA1C MFR BLD HPLC: 5.1 %

## 2025-02-27 ENCOUNTER — APPOINTMENT (OUTPATIENT)
Dept: OBGYN | Facility: CLINIC | Age: 35
End: 2025-02-27
Payer: COMMERCIAL

## 2025-02-27 ENCOUNTER — NON-APPOINTMENT (OUTPATIENT)
Age: 35
End: 2025-02-27

## 2025-02-27 VITALS
DIASTOLIC BLOOD PRESSURE: 58 MMHG | HEIGHT: 67 IN | SYSTOLIC BLOOD PRESSURE: 112 MMHG | BODY MASS INDEX: 27.94 KG/M2 | WEIGHT: 178 LBS

## 2025-02-27 PROCEDURE — 0502F SUBSEQUENT PRENATAL CARE: CPT

## 2025-03-07 ENCOUNTER — APPOINTMENT (OUTPATIENT)
Dept: ANTEPARTUM | Facility: CLINIC | Age: 35
End: 2025-03-07
Payer: COMMERCIAL

## 2025-03-07 ENCOUNTER — NON-APPOINTMENT (OUTPATIENT)
Age: 35
End: 2025-03-07

## 2025-03-07 ENCOUNTER — ASOB RESULT (OUTPATIENT)
Age: 35
End: 2025-03-07

## 2025-03-07 PROCEDURE — 76816 OB US FOLLOW-UP PER FETUS: CPT

## 2025-03-07 PROCEDURE — 76819 FETAL BIOPHYS PROFIL W/O NST: CPT | Mod: 59

## 2025-03-10 ENCOUNTER — NON-APPOINTMENT (OUTPATIENT)
Age: 35
End: 2025-03-10

## 2025-03-11 ENCOUNTER — APPOINTMENT (OUTPATIENT)
Dept: OBGYN | Facility: CLINIC | Age: 35
End: 2025-03-11
Payer: COMMERCIAL

## 2025-03-11 VITALS
WEIGHT: 178 LBS | BODY MASS INDEX: 27.94 KG/M2 | DIASTOLIC BLOOD PRESSURE: 69 MMHG | HEIGHT: 67 IN | SYSTOLIC BLOOD PRESSURE: 111 MMHG

## 2025-03-11 DIAGNOSIS — O99.280 ENDOCRINE, NUTRITIONAL AND METABOLIC DISEASES COMPLICATING PREGNANCY, UNSPECIFIED TRIMESTER: ICD-10-CM

## 2025-03-11 DIAGNOSIS — E03.9 ENDOCRINE, NUTRITIONAL AND METABOLIC DISEASES COMPLICATING PREGNANCY, UNSPECIFIED TRIMESTER: ICD-10-CM

## 2025-03-11 DIAGNOSIS — Z34.93 ENCOUNTER FOR SUPERVISION OF NORMAL PREGNANCY, UNSPECIFIED, THIRD TRIMESTER: ICD-10-CM

## 2025-03-11 PROCEDURE — 0502F SUBSEQUENT PRENATAL CARE: CPT

## 2025-03-12 ENCOUNTER — NON-APPOINTMENT (OUTPATIENT)
Age: 35
End: 2025-03-12

## 2025-03-14 ENCOUNTER — NON-APPOINTMENT (OUTPATIENT)
Age: 35
End: 2025-03-14

## 2025-03-21 ENCOUNTER — OUTPATIENT (OUTPATIENT)
Dept: INPATIENT UNIT | Facility: HOSPITAL | Age: 35
LOS: 1 days | End: 2025-03-21
Payer: COMMERCIAL

## 2025-03-21 VITALS
HEART RATE: 89 BPM | TEMPERATURE: 98 F | RESPIRATION RATE: 15 BRPM | DIASTOLIC BLOOD PRESSURE: 55 MMHG | SYSTOLIC BLOOD PRESSURE: 108 MMHG

## 2025-03-21 DIAGNOSIS — O47.00 FALSE LABOR BEFORE 37 COMPLETED WEEKS OF GESTATION, UNSPECIFIED TRIMESTER: ICD-10-CM

## 2025-03-21 DIAGNOSIS — O26.899 OTHER SPECIFIED PREGNANCY RELATED CONDITIONS, UNSPECIFIED TRIMESTER: ICD-10-CM

## 2025-03-21 DIAGNOSIS — Z3A.34 34 WEEKS GESTATION OF PREGNANCY: ICD-10-CM

## 2025-03-21 DIAGNOSIS — Z98.890 OTHER SPECIFIED POSTPROCEDURAL STATES: Chronic | ICD-10-CM

## 2025-03-21 LAB
APPEARANCE UR: CLEAR — SIGNIFICANT CHANGE UP
BACTERIA # UR AUTO: NEGATIVE /HPF — SIGNIFICANT CHANGE UP
BILIRUB UR-MCNC: NEGATIVE — SIGNIFICANT CHANGE UP
CAST: 1 /LPF — SIGNIFICANT CHANGE UP (ref 0–4)
COLOR SPEC: YELLOW — SIGNIFICANT CHANGE UP
DIFF PNL FLD: NEGATIVE — SIGNIFICANT CHANGE UP
GLUCOSE UR QL: NEGATIVE MG/DL — SIGNIFICANT CHANGE UP
KETONES UR-MCNC: NEGATIVE MG/DL — SIGNIFICANT CHANGE UP
LEUKOCYTE ESTERASE UR-ACNC: NEGATIVE — SIGNIFICANT CHANGE UP
NITRITE UR-MCNC: NEGATIVE — SIGNIFICANT CHANGE UP
PH UR: 7 — SIGNIFICANT CHANGE UP (ref 5–8)
SP GR SPEC: 1 — SIGNIFICANT CHANGE UP (ref 1–1.03)
SQUAMOUS # UR AUTO: 5 /HPF — SIGNIFICANT CHANGE UP (ref 0–5)
UROBILINOGEN FLD QL: 0.2 MG/DL — SIGNIFICANT CHANGE UP (ref 0.2–1)
WBC UR QL: 1 /HPF — SIGNIFICANT CHANGE UP (ref 0–5)

## 2025-03-21 PROCEDURE — G0463: CPT

## 2025-03-21 PROCEDURE — 59025 FETAL NON-STRESS TEST: CPT

## 2025-03-21 PROCEDURE — 96372 THER/PROPH/DIAG INJ SC/IM: CPT

## 2025-03-21 PROCEDURE — 81001 URINALYSIS AUTO W/SCOPE: CPT

## 2025-03-21 PROCEDURE — 99234 HOSP IP/OBS SM DT SF/LOW 45: CPT

## 2025-03-21 PROCEDURE — 96360 HYDRATION IV INFUSION INIT: CPT

## 2025-03-21 RX ORDER — SODIUM CHLORIDE 9 G/1000ML
1000 INJECTION, SOLUTION INTRAVENOUS ONCE
Refills: 0 | Status: ACTIVE | OUTPATIENT
Start: 2025-03-21 | End: 2025-03-21

## 2025-03-21 RX ADMIN — Medication 12 MILLIGRAM(S): at 16:35

## 2025-03-21 NOTE — CONSULT NOTE ADULT - PROBLEM SELECTOR RECOMMENDATION 9
Patient presenting with  contractions every 2 minutes. Contractions have persisted despite IVF hydration. Cervical exam has remained unchanged after 4.5 hours. Betamethasone was given by primary provider prophylactically; patient to receive second dose in 24h. Urinalysis was negative and no vaginitis was noted on speculum exam. Sonogram showed MARIBELL of ***. Recommend ***. Patient presenting with  contractions every 2 minutes. Contractions have persisted despite IVF hydration. Cervical exam has remained unchanged after 4.5 hours. Betamethasone was given by primary provider prophylactically; patient to receive second dose in 24h. Urinalysis was negative and no vaginitis was noted on speculum exam. Sonogram showed MARIBELL of 17.53. Recommend discharge to home with strict return precautions as patient is not making cervical change. Patient to return in 24 hours for second BMZ.

## 2025-03-21 NOTE — OB RN TRIAGE NOTE - AS PAIN REST
[FreeTextEntry1] : At this point I do not think she needs surgery. Her best option is to continue physical therapy and pain management.
3 (mild pain)

## 2025-03-21 NOTE — OB PROVIDER TRIAGE NOTE - NSHPPHYSICALEXAM_GEN_ALL_CORE
T(C): 36.8 (03-21-25 @ 12:31), Max: 36.8 (03-21-25 @ 12:31)  HR: 89 (03-21-25 @ 12:37) (89 - 89)  BP: 108/55 (03-21-25 @ 12:37) (108/55 - 108/55)  RR: 15 (03-21-25 @ 12:31) (15 - 15)  SpO2: --    Gen: NAD, well-appearing, AAOx3   Abd: Soft, gravid  Ext: non-tender, non-edematous  SSE: normal physiologic discharge, no VB noted, cervix appeared multiparous but closed  SVE:  0/50/-3 soft, midline  FHT: 125bpm baseline, moderate variability, + accelerations, no decelerations  [place]: q2-3m

## 2025-03-21 NOTE — OB PROVIDER TRIAGE NOTE - NSOBPROVIDERNOTE_OBGYN_ALL_OB_FT
34y  at 24.2 weeks GA who presents to L&D for evaluation of contractions q2-10m starting last night    -Maternal vitals wnl  -NST reactive  -q2-3m contractions,  mild to palpation on tocometer  -SSE and SVE cervix closed  -Plan for UA and IVF bolus then reassess contractions    D/w Dr. Pimentel 34y  at 24.2 weeks GA who presents to L&D for evaluation of contractions q2-10m starting last night    -Maternal vitals wnl  -NST reactive  -q2-3m contractions,  mild to palpation on tocometer  -SSE and SVE cervix closed  -Plan for UA and IVF bolus then reassess contractions    D/w Dr. Pimentel    Update 1500:  Patient s/p IVF still with CTX spaced q2-5m. Patient able to talk through them - uncomfortable but not painful  Rpt SVE unchanged   Dr. Pimentel updated -coming in to talk to patient and come up with plan 34y  at 24.2 weeks GA who presents to L&D for evaluation of contractions q2-10m starting last night    -Maternal vitals wnl  -NST reactive  -q2-3m contractions,  mild to palpation on tocometer  -SSE and SVE cervix closed  -Plan for UA and IVF bolus then reassess contractions    D/w Dr. Pimentel    Update 1500:  Patient s/p IVF still with CTX spaced q2-5m. Patient able to talk through them - uncomfortable but not painful  Rpt SVE unchanged   Dr. Pimentel updated -coming in to talk to patient and come up with plan    Update 1600:  Plan for BMZ #1  Plan to rpt exam @1730 to assess for cervical change  D/w Dr. Pimentel 34y  at 24.2 weeks GA who presents to L&D for evaluation of contractions q2-10m starting last night    -Maternal vitals wnl  -NST reactive  -q2-3m contractions,  mild to palpation on tocometer  -SSE and SVE cervix closed  -Plan for UA and IVF bolus then reassess contractions    D/w Dr. Pimentel    Update 1500:  Patient s/p IVF still with CTX spaced q2-5m. Patient able to talk through them - uncomfortable but not painful  Rpt SVE unchanged   Dr. Pimentel updated -coming in to talk to patient and come up with plan    Update 1600:  Plan for BMZ #1  Plan to rpt exam @1730 to assess for cervical change  D/w Dr. Pimentel    update 1740:  Continues to have ctx q3m; still same intensity  s/p BMZ  SVE remains unchanged  MFM consultation  D/w Dr. Margarito Pimentel 34y  at 24.2 weeks GA who presents to L&D for evaluation of contractions q2-10m starting last night    -Maternal vitals wnl  -NST reactive  -q2-3m contractions,  mild to palpation on tocometer  -SSE and SVE cervix closed  -Plan for UA and IVF bolus then reassess contractions    D/w Dr. Pimentel    Update 1500:  Patient s/p IVF still with CTX spaced q2-5m. Patient able to talk through them - uncomfortable but not painful  Rpt SVE unchanged   Dr. Pimentel updated -coming in to talk to patient and come up with plan    Update 1600:  Plan for BMZ #1  Plan to rpt exam @1730 to assess for cervical change  D/w Dr. Pimentel    update 1740:  Continues to have ctx q3m; still same intensity  s/p BMZ  SVE remains unchanged  MFM consultation  D/w Dr. Margarito Pimentel    Addendum @   -Patient reports contractions same intensity, not worsening.  -S/p BMX  -Bedside sono shower vertex presentation, MARIBELL 17.53  -MFM consulted. Patient cleared for discharge and SVE has remained unchanged.  -Patient to return tomorrow for second dose of BMZ.  -Strict return precautions given.    Discussed with Dr. Pimentel 34y  at 24.2 weeks GA who presents to L&D for evaluation of contractions q2-10m starting last night    -Maternal vitals wnl  -NST reactive  -q2-3m contractions,  mild to palpation on tocometer  -SSE and SVE cervix closed  -Plan for UA and IVF bolus then reassess contractions    D/w Dr. Pimentel    Update 1500:  Patient s/p IVF still with CTX spaced q2-5m. Patient able to talk through them - uncomfortable but not painful  Rpt SVE unchanged   Dr. Pimentel updated -coming in to talk to patient and come up with plan    Update 1600:  Plan for BMZ #1  Plan to rpt exam @1730 to assess for cervical change  D/w Dr. Pimentel    update 1740:  Continues to have ctx q3m; still same intensity  s/p BMZ  SVE remains unchanged  MFM consultation  D/w Dr. Margarito Pimentel    Addendum @   -Patient reports contractions same intensity, not worsening.  -S/p BMX  -Bedside sono shower vertex presentation, MARIBELL 17.53  -MFM consulted. Patient cleared for discharge and SVE has remained unchanged.  -Patient to return tomorrow for second dose of BMZ.  -Strict return precautions given.    Discussed with Dr. Pimentel    Addendum:    Subjective Hx, Physical Exam, Laboratory & Imaging results reviewed.  I agree with the Resident Physician's assessment and plan of care, as discussed above.  Call, follow up, and return to Hospital parameters reviewed at length.  She was given the opportunity to ask questions and all were addressed.  Discharge home    Ankit Pimentel,  34y  at 34.2 weeks GA who presents to L&D for evaluation of contractions q2-10m starting last night    -Maternal vitals wnl  -NST reactive  -q2-3m contractions,  mild to palpation on tocometer  -SSE and SVE cervix closed  -Plan for UA and IVF bolus then reassess contractions    D/w Dr. Pimentel    Update 1500:  Patient s/p IVF still with CTX spaced q2-5m. Patient able to talk through them - uncomfortable but not painful  Rpt SVE unchanged   Dr. Pimentel updated -coming in to talk to patient and come up with plan    Update 1600:  Plan for BMZ #1  Plan to rpt exam @1730 to assess for cervical change  D/w Dr. Pimentel    update 1740:  Continues to have ctx q3m; still same intensity  s/p BMZ  SVE remains unchanged  MFM consultation  D/w Dr. Margarito Pimentel    Addendum @   -Patient reports contractions same intensity, not worsening.  -S/p BMX  -Bedside sono shower vertex presentation, MARIBELL 17.53  -MFM consulted. Patient cleared for discharge and SVE has remained unchanged.  -Patient to return tomorrow for second dose of BMZ.  -Strict return precautions given.    Discussed with Dr. Pimentel    Addendum:    Subjective Hx, Physical Exam, Laboratory & Imaging results reviewed.  I agree with the Resident Physician's assessment and plan of care, as discussed above.  Call, follow up, and return to Hospital parameters reviewed at length.  She was given the opportunity to ask questions and all were addressed.  Discharge home    Ankit Pimentel,

## 2025-03-21 NOTE — OB PROVIDER TRIAGE NOTE - HISTORY OF PRESENT ILLNESS
34y  at 24.2 weeks GA who presents to L&D for evaluation of contractions q2-10m starting last night. She reports that she had started having contractions last night - she fluid hydrated and the contractions spaced out enough for her to fall asleep; however, they resumed when she woke up this morning and became closer together. Patient denies vaginal bleeding, and leakage of fluid. She endorses good fetal movement. Denies fevers, chills, nausea, vomiting, chest pain, SOB, dizziness and headache. No other complaints at this time.   SONA: 25  LMP: 24  Prenatal course is significant for:  Hx of LEEP - was monitored in cervical length program  Hx of admission for PNA  Prior pregnancy complicated by PTL <32w that she received Mg and BMZ for but then went on to have a IOL at 39w     POB: NSVDx1   PGYN: -fibroids, -ovarian cysts, denies STD hx, denies abnormal PAPs   PMH: hypothyroidism, childhood asthma, migraines  PSH: LEEP, left knee surgery  SH: Denies EtOH, tobacco and illicit drug use during this pregnancy; feels safe at home   Meds: PNVs, synthroid 75mcg, PNVs  Allergies: Cipro, Ceclor (hives)   34y  at 34.2 weeks GA who presents to L&D for evaluation of contractions q2-10m starting last night. She reports that she had started having contractions last night - she fluid hydrated and the contractions spaced out enough for her to fall asleep; however, they resumed when she woke up this morning and became closer together. Patient denies vaginal bleeding, and leakage of fluid. She endorses good fetal movement. Denies fevers, chills, nausea, vomiting, chest pain, SOB, dizziness and headache. No other complaints at this time.   SONA: 25  LMP: 24  Prenatal course is significant for:  Hx of LEEP - was monitored in cervical length program  Hx of admission for PNA  Prior pregnancy complicated by PTL <32w that she received Mg and BMZ for but then went on to have a IOL at 39w     POB: NSVDx1   PGYN: -fibroids, -ovarian cysts, denies STD hx, denies abnormal PAPs   PMH: hypothyroidism, childhood asthma, migraines  PSH: LEEP, left knee surgery  SH: Denies EtOH, tobacco and illicit drug use during this pregnancy; feels safe at home   Meds: PNVs, synthroid 75mcg, PNVs  Allergies: Cipro, Ceclor (hives)

## 2025-03-21 NOTE — CONSULT NOTE ADULT - PROBLEM SELECTOR RECOMMENDATION 2
FHT reactive  Contractions as above FHT reactive  Contractions every 3-5 min  Bedside Sono shows vertex presentation, MARIBELL 17.53

## 2025-03-21 NOTE — CONSULT NOTE ADULT - SUBJECTIVE AND OBJECTIVE BOX
34y  at 34w2d weeks GA who presents to L&D for evaluation of contractions q2-10m starting last night. She reports that she had started having contractions last night - she fluid hydrated and the contractions spaced out enough for her to fall asleep; however, they resumed when she woke up this morning and became closer together. Patient denies vaginal bleeding, and leakage of fluid. She endorses good fetal movement. Denies fevers, chills, nausea, vomiting, chest pain, SOB, dizziness and headache. No other complaints at this time.     SONA: 25  LMP: 24    Prenatal course is significant for:  Hx of LEEP - was monitored in cervical length program  Hx of admission for PNA  Prior pregnancy complicated by PTL <32w that she received Mg and BMZ for but then went on to have a IOL at 39w     POB: NSVDx1   PGYN: -fibroids, -ovarian cysts, denies STD hx, denies abnormal PAPs   PMH: hypothyroidism, childhood asthma, migraines  PSH: LEEP, left knee surgery  SH: Denies EtOH, tobacco and illicit drug use during this pregnancy; feels safe at home   Meds: PNVs, synthroid 75mcg  Allergies: Cipro, Ceclor (hives)    T(C): 36.8 (25 @ 12:31), Max: 36.8 (25 @ 12:31)  HR: 89 (25 @ 12:37) (89 - 89)  BP: 108/55 (25 @ 12:37) (108/55 - 108/55)  RR: 15 (25 @ 12:31) (15 - 15)    Gen: NAD, well-appearing, AAOx3   Abd: Soft, gravid  Ext: non-tender, non-edematous  SSE: normal physiologic discharge, no VB noted, cervix appeared multiparous but closed  SVE:  0/50/-3 soft, midline @ 1300, 1500, 1730  FHT: 125bpm baseline, moderate variability, + accelerations, no decelerations  [place]: q2-3m    Sono (3/7): vertex, anterior, [id number] (84%ile), BPP 8/8, MARIBELL 21.65

## 2025-03-21 NOTE — OB RN TRIAGE NOTE - FALL HARM RISK - TYPE OF ASSESSMENT
Pap guidelines reviewed  Pap with HPV done today  Repeat BP : 134/88  Reviewed concern of rising BP while on estrogen combined pill  Will continue to monitor BP and call if persistently elevated to discuss other birth control options  Return to office for annual or as needed 
Admission

## 2025-03-21 NOTE — OB RN TRIAGE NOTE - PAIN SCALE PREFERRED, PROFILE
Dx: right knee LFC cartilage defect, PF chondrosis    Plan:  · Right knee injection 4/4/80  · PT w/ Mckenna @ [facility] [place] - PF protocol - give protocol to patient as well  · Refer to Chuck @ [facility] for left femur chondroid lesion - give patient CD with left femur MRI and knee x-rays  · Follow-up in 4 weeks w Dr. López    Thank you for choosing [facility] and Dr. Fabrice López for your orthopedic & sports medicine care. It is our goal to provide you with exceptional care that will help keep you healthy, active, and get you back in the game.    If you felt that you received exemplary care today, please consider leaving us feedback on [email or web address] at [email or web address].     Please do not hesitate to reach out to us via email, phone, or MyChart with any questions, concerns, or feedback.    If you are experiencing pain/discomfort or have questions after 5pm and would like to be connected to the [facility] on call team, please call this number [phone] and specify which Orthopedics/Sports Medicine provider is treating you.      numerical 0-10

## 2025-03-21 NOTE — CONSULT NOTE ADULT - ASSESSMENT
34y  at 34w2d weeks GA who presents to L&D for evaluation of  contractions. Betamethasone x1 dose given by primary provider. MFM consulted for discharge recommendations.

## 2025-03-21 NOTE — CONSULT NOTE ADULT - NSCONSULTADDITIONALINFOA_GEN_ALL_CORE
MFM Fellow Addendum    I did not personally evaluate this patient. Recommendations made based on review of the chart and discussion with the resident and her primary attending Dr Pimentel.     34y  at 34w2d weeks GA who presented to L&D for evaluation of contractions q2-10m the night before. She has a history of a prior term  with similar complaints in late  period. This pregnancy complicated by admission for pneumonia. Her cervix was closed on 2 exams 4 hours appart. The primary team gave her betamethasone for fetal lung maturity with plan to give another dose on 3/22.   Urinalysis negative  MARIBELL 17    I discussed this patient with Dr Dixon. There is no contraindication to discharge based on available information.     Nigel Walters DO  MFM Fellow.

## 2025-03-21 NOTE — OB RN TRIAGE NOTE - NSDCTEMPFAHRENHEIT_OBGYN_A_OB_CAL
1/14/2025 Gladis returns for follow-up of indigestion and dysphagia.  Dysphagia resolved following recent dilation, but she continues to have indigestion despite twice daily pantoprazole. She is also having some lack of appetite and midline abdominal discomfort.  She lost her brother about a week ago when she feels like the symptoms started then and seem more stress related.  No additional complaints.  She also had to travel to [place] for his arrangements and reports that she was eating a lot of hotel food. Sb 97.9

## 2025-03-22 ENCOUNTER — OUTPATIENT (OUTPATIENT)
Dept: OUTPATIENT SERVICES | Facility: HOSPITAL | Age: 35
LOS: 1 days | End: 2025-03-22
Payer: COMMERCIAL

## 2025-03-22 VITALS — DIASTOLIC BLOOD PRESSURE: 60 MMHG | SYSTOLIC BLOOD PRESSURE: 112 MMHG | HEART RATE: 77 BPM

## 2025-03-22 VITALS
DIASTOLIC BLOOD PRESSURE: 60 MMHG | TEMPERATURE: 98 F | SYSTOLIC BLOOD PRESSURE: 112 MMHG | HEART RATE: 77 BPM | RESPIRATION RATE: 20 BRPM

## 2025-03-22 DIAGNOSIS — O26.899 OTHER SPECIFIED PREGNANCY RELATED CONDITIONS, UNSPECIFIED TRIMESTER: ICD-10-CM

## 2025-03-22 DIAGNOSIS — Z98.890 OTHER SPECIFIED POSTPROCEDURAL STATES: Chronic | ICD-10-CM

## 2025-03-22 PROCEDURE — 96372 THER/PROPH/DIAG INJ SC/IM: CPT

## 2025-03-22 PROCEDURE — 59025 FETAL NON-STRESS TEST: CPT

## 2025-03-22 PROCEDURE — G0463: CPT

## 2025-03-22 PROCEDURE — 99234 HOSP IP/OBS SM DT SF/LOW 45: CPT | Mod: 25

## 2025-03-22 PROCEDURE — 59025 FETAL NON-STRESS TEST: CPT | Mod: 26

## 2025-03-22 RX ADMIN — Medication 12 MILLIGRAM(S): at 16:24

## 2025-03-24 DIAGNOSIS — J45.909 UNSPECIFIED ASTHMA, UNCOMPLICATED: ICD-10-CM

## 2025-03-24 DIAGNOSIS — O47.03 FALSE LABOR BEFORE 37 COMPLETED WEEKS OF GESTATION, THIRD TRIMESTER: ICD-10-CM

## 2025-03-24 DIAGNOSIS — O99.283 ENDOCRINE, NUTRITIONAL AND METABOLIC DISEASES COMPLICATING PREGNANCY, THIRD TRIMESTER: ICD-10-CM

## 2025-03-24 DIAGNOSIS — G43.109 MIGRAINE WITH AURA, NOT INTRACTABLE, WITHOUT STATUS MIGRAINOSUS: ICD-10-CM

## 2025-03-24 DIAGNOSIS — Z3A.34 34 WEEKS GESTATION OF PREGNANCY: ICD-10-CM

## 2025-03-24 DIAGNOSIS — O99.353 DISEASES OF THE NERVOUS SYSTEM COMPLICATING PREGNANCY, THIRD TRIMESTER: ICD-10-CM

## 2025-03-24 DIAGNOSIS — O99.513 DISEASES OF THE RESPIRATORY SYSTEM COMPLICATING PREGNANCY, THIRD TRIMESTER: ICD-10-CM

## 2025-03-24 DIAGNOSIS — E03.9 HYPOTHYROIDISM, UNSPECIFIED: ICD-10-CM

## 2025-03-24 DIAGNOSIS — G43.909 MIGRAINE, UNSPECIFIED, NOT INTRACTABLE, WITHOUT STATUS MIGRAINOSUS: ICD-10-CM

## 2025-03-25 ENCOUNTER — APPOINTMENT (OUTPATIENT)
Dept: OBGYN | Facility: CLINIC | Age: 35
End: 2025-03-25
Payer: COMMERCIAL

## 2025-03-25 VITALS
BODY MASS INDEX: 28.09 KG/M2 | HEIGHT: 67 IN | DIASTOLIC BLOOD PRESSURE: 67 MMHG | SYSTOLIC BLOOD PRESSURE: 113 MMHG | WEIGHT: 179 LBS

## 2025-03-25 PROCEDURE — 0502F SUBSEQUENT PRENATAL CARE: CPT

## 2025-04-04 ENCOUNTER — APPOINTMENT (OUTPATIENT)
Dept: OBGYN | Facility: CLINIC | Age: 35
End: 2025-04-04
Payer: COMMERCIAL

## 2025-04-04 ENCOUNTER — NON-APPOINTMENT (OUTPATIENT)
Age: 35
End: 2025-04-04

## 2025-04-04 ENCOUNTER — APPOINTMENT (OUTPATIENT)
Dept: ANTEPARTUM | Facility: CLINIC | Age: 35
End: 2025-04-04

## 2025-04-04 ENCOUNTER — ASOB RESULT (OUTPATIENT)
Age: 35
End: 2025-04-04

## 2025-04-04 DIAGNOSIS — O99.280 ENDOCRINE, NUTRITIONAL AND METABOLIC DISEASES COMPLICATING PREGNANCY, UNSPECIFIED TRIMESTER: ICD-10-CM

## 2025-04-04 DIAGNOSIS — Z34.93 ENCOUNTER FOR SUPERVISION OF NORMAL PREGNANCY, UNSPECIFIED, THIRD TRIMESTER: ICD-10-CM

## 2025-04-04 DIAGNOSIS — E03.9 ENDOCRINE, NUTRITIONAL AND METABOLIC DISEASES COMPLICATING PREGNANCY, UNSPECIFIED TRIMESTER: ICD-10-CM

## 2025-04-04 PROCEDURE — 0502F SUBSEQUENT PRENATAL CARE: CPT

## 2025-04-04 PROCEDURE — 76819 FETAL BIOPHYS PROFIL W/O NST: CPT | Mod: 59

## 2025-04-04 PROCEDURE — 76816 OB US FOLLOW-UP PER FETUS: CPT

## 2025-04-08 LAB — B-HEM STREP SPEC QL CULT: NORMAL

## 2025-04-10 ENCOUNTER — NON-APPOINTMENT (OUTPATIENT)
Age: 35
End: 2025-04-10

## 2025-04-10 ENCOUNTER — APPOINTMENT (OUTPATIENT)
Dept: OBGYN | Facility: CLINIC | Age: 35
End: 2025-04-10
Payer: COMMERCIAL

## 2025-04-10 VITALS
DIASTOLIC BLOOD PRESSURE: 72 MMHG | WEIGHT: 174 LBS | HEIGHT: 67 IN | SYSTOLIC BLOOD PRESSURE: 110 MMHG | BODY MASS INDEX: 27.31 KG/M2

## 2025-04-10 PROCEDURE — 0502F SUBSEQUENT PRENATAL CARE: CPT

## 2025-04-11 LAB
BASOPHILS # BLD AUTO: 0.02 K/UL
BASOPHILS NFR BLD AUTO: 0.3 %
EOSINOPHIL # BLD AUTO: 0.04 K/UL
EOSINOPHIL NFR BLD AUTO: 0.6 %
HCT VFR BLD CALC: 38.5 %
HGB BLD-MCNC: 12.4 G/DL
HIV1+2 AB SPEC QL IA.RAPID: NONREACTIVE
IMM GRANULOCYTES NFR BLD AUTO: 0.8 %
LYMPHOCYTES # BLD AUTO: 1 K/UL
LYMPHOCYTES NFR BLD AUTO: 13.8 %
MAN DIFF?: NORMAL
MCHC RBC-ENTMCNC: 31.2 PG
MCHC RBC-ENTMCNC: 32.2 G/DL
MCV RBC AUTO: 97 FL
MONOCYTES # BLD AUTO: 0.44 K/UL
MONOCYTES NFR BLD AUTO: 6.1 %
NEUTROPHILS # BLD AUTO: 5.71 K/UL
NEUTROPHILS NFR BLD AUTO: 78.4 %
PLATELET # BLD AUTO: 199 K/UL
RBC # BLD: 3.97 M/UL
RBC # FLD: 12.9 %
T PALLIDUM AB SER QL IA: NEGATIVE
T4 FREE SERPL-MCNC: 1.1 NG/DL
TSH SERPL-ACNC: 1.34 UIU/ML
WBC # FLD AUTO: 7.27 K/UL

## 2025-04-14 ENCOUNTER — NON-APPOINTMENT (OUTPATIENT)
Age: 35
End: 2025-04-14

## 2025-04-15 ENCOUNTER — APPOINTMENT (OUTPATIENT)
Dept: OBGYN | Facility: CLINIC | Age: 35
End: 2025-04-15
Payer: COMMERCIAL

## 2025-04-15 PROCEDURE — 0502F SUBSEQUENT PRENATAL CARE: CPT

## 2025-04-23 ENCOUNTER — APPOINTMENT (OUTPATIENT)
Dept: OBGYN | Facility: CLINIC | Age: 35
End: 2025-04-23
Payer: COMMERCIAL

## 2025-04-23 VITALS
DIASTOLIC BLOOD PRESSURE: 68 MMHG | SYSTOLIC BLOOD PRESSURE: 117 MMHG | BODY MASS INDEX: 28.56 KG/M2 | HEIGHT: 67 IN | WEIGHT: 182 LBS

## 2025-04-23 DIAGNOSIS — O99.280 ENDOCRINE, NUTRITIONAL AND METABOLIC DISEASES COMPLICATING PREGNANCY, UNSPECIFIED TRIMESTER: ICD-10-CM

## 2025-04-23 DIAGNOSIS — Z98.890 MATERNAL CARE FOR OTHER ABNORMALITIES OF CERVIX, UNSPECIFIED TRIMESTER: ICD-10-CM

## 2025-04-23 DIAGNOSIS — Z34.93 ENCOUNTER FOR SUPERVISION OF NORMAL PREGNANCY, UNSPECIFIED, THIRD TRIMESTER: ICD-10-CM

## 2025-04-23 DIAGNOSIS — E03.9 ENDOCRINE, NUTRITIONAL AND METABOLIC DISEASES COMPLICATING PREGNANCY, UNSPECIFIED TRIMESTER: ICD-10-CM

## 2025-04-23 DIAGNOSIS — O34.40 MATERNAL CARE FOR OTHER ABNORMALITIES OF CERVIX, UNSPECIFIED TRIMESTER: ICD-10-CM

## 2025-04-23 PROCEDURE — 0502F SUBSEQUENT PRENATAL CARE: CPT

## 2025-04-24 ENCOUNTER — APPOINTMENT (OUTPATIENT)
Dept: OBGYN | Facility: HOSPITAL | Age: 35
End: 2025-04-24

## 2025-04-24 ENCOUNTER — TRANSCRIPTION ENCOUNTER (OUTPATIENT)
Age: 35
End: 2025-04-24

## 2025-04-24 ENCOUNTER — INPATIENT (INPATIENT)
Facility: HOSPITAL | Age: 35
LOS: 0 days | Discharge: HOME | DRG: 833 | End: 2025-04-25
Attending: OBSTETRICS & GYNECOLOGY | Admitting: OBSTETRICS & GYNECOLOGY
Payer: COMMERCIAL

## 2025-04-24 VITALS
SYSTOLIC BLOOD PRESSURE: 118 MMHG | HEART RATE: 98 BPM | WEIGHT: 179.9 LBS | TEMPERATURE: 98 F | DIASTOLIC BLOOD PRESSURE: 74 MMHG | RESPIRATION RATE: 18 BRPM | HEIGHT: 67 IN

## 2025-04-24 DIAGNOSIS — O34.40 MATERNAL CARE FOR OTHER ABNORMALITIES OF CERVIX, UNSPECIFIED TRIMESTER: ICD-10-CM

## 2025-04-24 DIAGNOSIS — Z98.890 OTHER SPECIFIED POSTPROCEDURAL STATES: Chronic | ICD-10-CM

## 2025-04-24 LAB
ABO RH CONFIRMATION: SIGNIFICANT CHANGE UP
BASOPHILS # BLD AUTO: 0.04 K/UL — SIGNIFICANT CHANGE UP (ref 0–0.2)
BASOPHILS NFR BLD AUTO: 0.5 % — SIGNIFICANT CHANGE UP (ref 0–2)
BLD GP AB SCN SERPL QL: SIGNIFICANT CHANGE UP
EOSINOPHIL # BLD AUTO: 0.06 K/UL — SIGNIFICANT CHANGE UP (ref 0–0.5)
EOSINOPHIL NFR BLD AUTO: 0.7 % — SIGNIFICANT CHANGE UP (ref 0–6)
HCT VFR BLD CALC: 36.5 % — SIGNIFICANT CHANGE UP (ref 34.5–45)
HGB BLD-MCNC: 12.3 G/DL — SIGNIFICANT CHANGE UP (ref 11.5–15.5)
IMM GRANULOCYTES # BLD AUTO: 0.1 K/UL — HIGH (ref 0–0.07)
IMM GRANULOCYTES NFR BLD AUTO: 1.2 % — HIGH (ref 0–0.9)
LYMPHOCYTES # BLD AUTO: 1.48 K/UL — SIGNIFICANT CHANGE UP (ref 1–3.3)
LYMPHOCYTES NFR BLD AUTO: 18.4 % — SIGNIFICANT CHANGE UP (ref 13–44)
MCHC RBC-ENTMCNC: 30.8 PG — SIGNIFICANT CHANGE UP (ref 27–34)
MCHC RBC-ENTMCNC: 33.7 G/DL — SIGNIFICANT CHANGE UP (ref 32–36)
MCV RBC AUTO: 91.3 FL — SIGNIFICANT CHANGE UP (ref 80–100)
MONOCYTES # BLD AUTO: 0.52 K/UL — SIGNIFICANT CHANGE UP (ref 0–0.9)
MONOCYTES NFR BLD AUTO: 6.5 % — SIGNIFICANT CHANGE UP (ref 2–14)
NEUTROPHILS # BLD AUTO: 5.85 K/UL — SIGNIFICANT CHANGE UP (ref 1.8–7.4)
NEUTROPHILS NFR BLD AUTO: 72.7 % — SIGNIFICANT CHANGE UP (ref 43–77)
NRBC # BLD AUTO: 0 K/UL — SIGNIFICANT CHANGE UP (ref 0–0)
NRBC # FLD: 0 K/UL — SIGNIFICANT CHANGE UP (ref 0–0)
NRBC BLD AUTO-RTO: 0 /100 WBCS — SIGNIFICANT CHANGE UP (ref 0–0)
PLATELET # BLD AUTO: 220 K/UL — SIGNIFICANT CHANGE UP (ref 150–400)
PMV BLD: 10.2 FL — SIGNIFICANT CHANGE UP (ref 7–13)
RBC # BLD: 4 M/UL — SIGNIFICANT CHANGE UP (ref 3.8–5.2)
RBC # FLD: 12.1 % — SIGNIFICANT CHANGE UP (ref 10.3–14.5)
T PALLIDUM AB TITR SER: NEGATIVE — SIGNIFICANT CHANGE UP
WBC # BLD: 8.05 K/UL — SIGNIFICANT CHANGE UP (ref 3.8–10.5)
WBC # FLD AUTO: 8.05 K/UL — SIGNIFICANT CHANGE UP (ref 3.8–10.5)

## 2025-04-24 PROCEDURE — 59400 OBSTETRICAL CARE: CPT

## 2025-04-24 RX ORDER — BENZOCAINE 220 MG/G
1 SPRAY, METERED PERIODONTAL EVERY 6 HOURS
Refills: 0 | Status: DISCONTINUED | OUTPATIENT
Start: 2025-04-24 | End: 2025-04-25

## 2025-04-24 RX ORDER — ACETAMINOPHEN 500 MG/5ML
975 LIQUID (ML) ORAL
Refills: 0 | Status: DISCONTINUED | OUTPATIENT
Start: 2025-04-24 | End: 2025-04-25

## 2025-04-24 RX ORDER — CITRIC ACID/SODIUM CITRATE 300-500 MG
30 SOLUTION, ORAL ORAL ONCE
Refills: 0 | Status: DISCONTINUED | OUTPATIENT
Start: 2025-04-24 | End: 2025-04-24

## 2025-04-24 RX ORDER — OXYCODONE HYDROCHLORIDE 30 MG/1
5 TABLET ORAL ONCE
Refills: 0 | Status: DISCONTINUED | OUTPATIENT
Start: 2025-04-24 | End: 2025-04-25

## 2025-04-24 RX ORDER — OXYTOCIN-SODIUM CHLORIDE 0.9% IV SOLN 30 UNIT/500ML 30-0.9/5 UT/ML-%
SOLUTION INTRAVENOUS
Qty: 30 | Refills: 0 | Status: DISCONTINUED | OUTPATIENT
Start: 2025-04-24 | End: 2025-04-24

## 2025-04-24 RX ORDER — PRAMOXINE HCL 1 %
1 GEL (GRAM) TOPICAL EVERY 4 HOURS
Refills: 0 | Status: DISCONTINUED | OUTPATIENT
Start: 2025-04-24 | End: 2025-04-25

## 2025-04-24 RX ORDER — OXYTOCIN-SODIUM CHLORIDE 0.9% IV SOLN 30 UNIT/500ML 30-0.9/5 UT/ML-%
167 SOLUTION INTRAVENOUS
Qty: 30 | Refills: 0 | Status: COMPLETED | OUTPATIENT
Start: 2025-04-24 | End: 2025-04-24

## 2025-04-24 RX ORDER — HYDROCORTISONE 10 MG/G
1 CREAM TOPICAL EVERY 6 HOURS
Refills: 0 | Status: DISCONTINUED | OUTPATIENT
Start: 2025-04-24 | End: 2025-04-25

## 2025-04-24 RX ORDER — IBUPROFEN 200 MG
600 TABLET ORAL EVERY 6 HOURS
Refills: 0 | Status: DISCONTINUED | OUTPATIENT
Start: 2025-04-24 | End: 2025-04-25

## 2025-04-24 RX ORDER — SODIUM CHLORIDE 9 G/1000ML
1000 INJECTION, SOLUTION INTRAVENOUS
Refills: 0 | Status: DISCONTINUED | OUTPATIENT
Start: 2025-04-24 | End: 2025-04-24

## 2025-04-24 RX ORDER — OXYTOCIN-SODIUM CHLORIDE 0.9% IV SOLN 30 UNIT/500ML 30-0.9/5 UT/ML-%
167 SOLUTION INTRAVENOUS
Qty: 30 | Refills: 0 | Status: DISCONTINUED | OUTPATIENT
Start: 2025-04-24 | End: 2025-04-25

## 2025-04-24 RX ORDER — LEVOTHYROXINE SODIUM 300 MCG
75 TABLET ORAL DAILY
Refills: 0 | Status: DISCONTINUED | OUTPATIENT
Start: 2025-04-24 | End: 2025-04-25

## 2025-04-24 RX ORDER — IBUPROFEN 200 MG
600 TABLET ORAL EVERY 6 HOURS
Refills: 0 | Status: COMPLETED | OUTPATIENT
Start: 2025-04-24 | End: 2026-03-23

## 2025-04-24 RX ORDER — SIMETHICONE 80 MG
80 TABLET,CHEWABLE ORAL EVERY 4 HOURS
Refills: 0 | Status: DISCONTINUED | OUTPATIENT
Start: 2025-04-24 | End: 2025-04-25

## 2025-04-24 RX ORDER — MAGNESIUM HYDROXIDE 400 MG/5ML
30 SUSPENSION ORAL
Refills: 0 | Status: DISCONTINUED | OUTPATIENT
Start: 2025-04-24 | End: 2025-04-25

## 2025-04-24 RX ORDER — CLOSTRIDIUM TETANI TOXOID ANTIGEN (FORMALDEHYDE INACTIVATED), CORYNEBACTERIUM DIPHTHERIAE TOXOID ANTIGEN (FORMALDEHYDE INACTIVATED), BORDETELLA PERTUSSIS TOXOID ANTIGEN (GLUTARALDEHYDE INACTIVATED), BORDETELLA PERTUSSIS FILAMENTOUS HEMAGGLUTININ ANTIGEN (FORMALDEHYDE INACTIVATED), BORDETELLA PERTUSSIS PERTACTIN ANTIGEN, AND BORDETELLA PERTUSSIS FIMBRIAE 2/3 ANTIGEN 5; 2; 2.5; 5; 3; 5 [LF]/.5ML; [LF]/.5ML; UG/.5ML; UG/.5ML; UG/.5ML; UG/.5ML
0.5 INJECTION, SUSPENSION INTRAMUSCULAR ONCE
Refills: 0 | Status: DISCONTINUED | OUTPATIENT
Start: 2025-04-24 | End: 2025-04-25

## 2025-04-24 RX ORDER — WITCH HAZEL LEAF
1 FLUID EXTRACT MISCELLANEOUS EVERY 4 HOURS
Refills: 0 | Status: DISCONTINUED | OUTPATIENT
Start: 2025-04-24 | End: 2025-04-25

## 2025-04-24 RX ORDER — KETOROLAC TROMETHAMINE 30 MG/ML
30 INJECTION, SOLUTION INTRAMUSCULAR; INTRAVENOUS ONCE
Refills: 0 | Status: DISCONTINUED | OUTPATIENT
Start: 2025-04-24 | End: 2025-04-24

## 2025-04-24 RX ORDER — DIBUCAINE 10 MG/G
1 OINTMENT TOPICAL EVERY 6 HOURS
Refills: 0 | Status: DISCONTINUED | OUTPATIENT
Start: 2025-04-24 | End: 2025-04-25

## 2025-04-24 RX ORDER — OXYCODONE HYDROCHLORIDE 30 MG/1
5 TABLET ORAL
Refills: 0 | Status: DISCONTINUED | OUTPATIENT
Start: 2025-04-24 | End: 2025-04-25

## 2025-04-24 RX ORDER — PRENATAL 136/IRON/FOLIC ACID 27 MG-1 MG
1 TABLET ORAL DAILY
Refills: 0 | Status: DISCONTINUED | OUTPATIENT
Start: 2025-04-24 | End: 2025-04-25

## 2025-04-24 RX ORDER — MODIFIED LANOLIN 100 %
1 CREAM (GRAM) TOPICAL EVERY 6 HOURS
Refills: 0 | Status: DISCONTINUED | OUTPATIENT
Start: 2025-04-24 | End: 2025-04-25

## 2025-04-24 RX ORDER — DIPHENHYDRAMINE HCL 12.5MG/5ML
25 ELIXIR ORAL EVERY 6 HOURS
Refills: 0 | Status: DISCONTINUED | OUTPATIENT
Start: 2025-04-24 | End: 2025-04-25

## 2025-04-24 RX ADMIN — OXYTOCIN-SODIUM CHLORIDE 0.9% IV SOLN 30 UNIT/500ML 167 MILLIUNIT(S)/MIN: 30-0.9/5 SOLUTION at 18:34

## 2025-04-24 RX ADMIN — SODIUM CHLORIDE 125 MILLILITER(S): 9 INJECTION, SOLUTION INTRAVENOUS at 05:50

## 2025-04-24 RX ADMIN — Medication 0.2 MILLIGRAM(S): at 17:58

## 2025-04-24 RX ADMIN — OXYTOCIN-SODIUM CHLORIDE 0.9% IV SOLN 30 UNIT/500ML 2 MILLIUNIT(S)/MIN: 30-0.9/5 SOLUTION at 16:07

## 2025-04-24 RX ADMIN — SODIUM CHLORIDE 125 MILLILITER(S): 9 INJECTION, SOLUTION INTRAVENOUS at 14:00

## 2025-04-24 RX ADMIN — Medication 975 MILLIGRAM(S): at 21:17

## 2025-04-24 RX ADMIN — Medication 3 MILLILITER(S): at 22:30

## 2025-04-24 RX ADMIN — Medication 0.2 MILLIGRAM(S): at 21:17

## 2025-04-24 RX ADMIN — KETOROLAC TROMETHAMINE 30 MILLIGRAM(S): 30 INJECTION, SOLUTION INTRAMUSCULAR; INTRAVENOUS at 18:34

## 2025-04-24 NOTE — DISCHARGE NOTE OB - HOSPITAL COURSE
Patient underwent a normal spontaneous vaginal delivery. She received rectal cytotec, IM methergine and was started on a PO methergine series postpartum. Post-partum course was otherwise uncomplicated. Pain is well controlled with PRN medication. She has no difficulty with ambulation, voiding, or PO intake. Lab values and vital signs are within normal limits prior to discharge.

## 2025-04-24 NOTE — OB RN DELIVERY SUMMARY - NS_SEPSISRSKCALC_OBGYN_ALL_OB_FT
EOS calculated successfully. EOS Risk Factor: 0.5/1000 live births ([facility] national incidence); GA=39w1d; Temp=98.24; ROM=3.283; GBS='Negative'; Antibiotics='No antibiotics or any antibiotics < 2 hrs prior to birth'

## 2025-04-24 NOTE — DISCHARGE NOTE OB - CARE PLAN
1 Principal Discharge DX:	 (normal spontaneous vaginal delivery)  Assessment and plan of treatment:	Patient underwent a normal spontaneous vaginal delivery. Post-partum course was uncomplicated. Pain is well controlled with PRN medication. She has no difficulty with ambulation, voiding, or PO intake. Lab values and vital signs are within normal limits prior to discharge.

## 2025-04-24 NOTE — DISCHARGE NOTE OB - INFLUENZA IMMUNIZATION DATE (APPROXIMATE):
Pt presents from  home with c.o blurred vision and eye problems past week. Pt reports he has been taking allergy meds with no relief. Pt also reports he is diabetic and takes metformin. Pt reports he has been taking meds as prescribed.   
12-Oct-2024

## 2025-04-24 NOTE — OB RN DELIVERY SUMMARY - NSSELHIDDEN_OBGYN_ALL_OB_FT
[NS_DeliveryAttending1_OBGYN_ALL_OB_FT:[id number]=],[NS_DeliveryRN_OBGYN_ALL_OB_FT:[id number]]

## 2025-04-24 NOTE — OB PROVIDER DELIVERY SUMMARY - NSSELHIDDEN_OBGYN_ALL_OB_FT
[NS_DeliveryAttending1_OBGYN_ALL_OB_FT:[id number]=],[NS_DeliveryRN_OBGYN_ALL_OB_FT:[id number]=]

## 2025-04-24 NOTE — OB PROVIDER H&P - HISTORY OF PRESENT ILLNESS
34y  at 39w1d weeks GA by LMP consistent with first trimester sono who presents to L&D for scheduled IOL. Patient denies vaginal bleeding, contractions and leakage of fluid. She endorses good fetal movement. Denies fevers, chills, nausea, vomiting, chest pain, SOB, dizziness and headache. No other complaints at this time.   SONA: 25  LMP: 24  Prenatal course is significant for:  Hx of LEEP - was monitored in cervical length program  Hx of admission for PNA  Hx of antepartum admission for  contractions s/p BMZ at 34w GA  Prior pregnancy complicated by PTL <32w that she received Mg and BMZ for but then went on to have a IOL at 39w     POB: NSVDx1   PGYN: -fibroids, -ovarian cysts, denies STD hx, denies abnormal PAPs   PMH: hypothyroidism, childhood asthma, migraines  PSH: LEEP, left knee surgery  SH: Denies EtOH, tobacco and illicit drug use during this pregnancy; feels safe at home   Meds: PNVs, synthroid 75mcg, PNVs  Allergies: Cipro, Ceclor (hives)

## 2025-04-24 NOTE — DISCHARGE NOTE OB - NS MD DC FALL RISK RISK
For information on Fall & Injury Prevention, visit: [email or web address] OR  [email or web address] OR  https://www.cdc.gov/steadi/patient.html

## 2025-04-24 NOTE — OB PROVIDER H&P - NSHPPHYSICALEXAM_GEN_ALL_CORE
T(C): --  HR: 98 (04-24-25 @ 05:45) (98 - 98)  BP: 118/74 (04-24-25 @ 05:45) (118/74 - 118/74)  RR: --  SpO2: --    Gen: NAD, well-appearing, AAOx3   Abd: Soft, gravid  Ext: non-tender, non-edematous  SVE:  1/70/-3  Bedside sono:  FHT: 120bpm baseline, moderate variability, + accelerations, no decelerations  [place]: occasional contraction

## 2025-04-24 NOTE — CHART NOTE - NSCHARTNOTEFT_GEN_A_CORE
Pt seen and examined at bedside s/p Cytotec 25MCG PV Q3 x1.  She appreciates her contractions every 5 minutes and her FCT is reassuring.  SVE 1/70/-3/P/S.  R/B/A discussed and she will ask for her Epidural then be AROMd followed by Pitocin augmentation PRN.  Call precautions reviewed with nursing and Pt.  All questions addressed.
Pt seen and examined s/p Epidural for pain management.  She is comfortable and her fetal cardiotocography is reassuring, not currently on Pitocin augmentation.  SVE 3/80/-2/M/S; AROM performed with copious clear fluid.  Limit vaginal examinations secondary to AROM.  Pitocin augmentation PRN.  Call precautions reviewed with both Pt and nursing.  All questions addressed.

## 2025-04-24 NOTE — DISCHARGE NOTE OB - FINANCIAL ASSISTANCE
[facility] provides services at a reduced cost to those who are determined to be eligible through [facility]’s financial assistance program. Information regarding [facility]’s financial assistance program can be found by going to [email or web address] or by calling [phone].

## 2025-04-24 NOTE — DISCHARGE NOTE OB - CARE PROVIDER_API CALL
Ankit Pimentel  Obstetrics and Gynecology  [address], [address]  [place] [address]  Phone: [phone]  Fax: [phone]  Established Patient  Follow Up Time: 1 month

## 2025-04-24 NOTE — DISCHARGE NOTE OB - PATIENT PORTAL LINK FT
You can access the FollowMyHealth Patient Portal offered by [facility] by registering at the following website: [email or web address]. By joining [email or web address] FollowMyHealth portal, you will also be able to view your health information using other applications (apps) compatible with our system.

## 2025-04-24 NOTE — OB PROVIDER H&P - ASSESSMENT
34y  at 39w1d weeks GA by LMP consistent with first trimester sono who presents to L&D for scheduled IOL.   -Admit to L&D  -Consent  -Admission labs  -IV fluids  -Fetus: Cat I tracing. Continuous toco and fetal monitoring.   -GBS: Negative, no GBS ppx required   -Analgesia: epidural on request  -Vaginal cytotec    Discussed with Dr. Pimentel   34y  at 39w1d weeks GA by LMP consistent with first trimester sono who presents to L&D for scheduled IOL.     Admit to L&D  Vital Signs per Unit Protocol  Admission Laboratory Panel  IVF LR @ 125cc/hr  Continuous Fetal Cardiotocography  GBS PPx, if indicated  Cytotec 25 MCG PV Q4 x2, followed by AROM, with Pitocin Augmentation PRN  Pain management at Pt Request    Discussed with Dr. Pimentel    Addendum:    Subjective Hx and Physical Exam reviewed.  I agree with the Resident Physician's assessment and plan of care, as discussed above.  R/B/A of admission for labor management, vaginal delivery with possible  section discussed at length, including medications and options for pain management.  She has no Gnosticist or personal objection to blood transfusion, if necessary.  She was given the opportunity to ask questions and all were addressed.  She understands the plan of care.    Ankit Pimentel, DO

## 2025-04-24 NOTE — OB PROVIDER DELIVERY SUMMARY - NSPROVIDERDELIVERYNOTE_OBGYN_ALL_OB_FT
Viable female  delivered, vaginally.  Delivery significant for a tight nuchal cord which baby was delivered through without complication.  2nd midline laceration repaired with chromic suture in normal fashion.  Cytotec 1000MCG WA x1 and Methergine 0.2MG IM x1 for uterine atony.  She will be placed on Methergine PO series for 24 hours.  Mother and baby doing well with plan for transfer to mother baby unit after observation.  Fetal weight deferred for skin-to-skin.

## 2025-04-25 ENCOUNTER — NON-APPOINTMENT (OUTPATIENT)
Age: 35
End: 2025-04-25

## 2025-04-25 VITALS
TEMPERATURE: 98 F | DIASTOLIC BLOOD PRESSURE: 75 MMHG | HEART RATE: 82 BPM | OXYGEN SATURATION: 96 % | SYSTOLIC BLOOD PRESSURE: 129 MMHG | RESPIRATION RATE: 18 BRPM

## 2025-04-25 LAB
HCT VFR BLD CALC: 35.6 % — SIGNIFICANT CHANGE UP (ref 34.5–45)
HGB BLD-MCNC: 11.9 G/DL — SIGNIFICANT CHANGE UP (ref 11.5–15.5)

## 2025-04-25 PROCEDURE — 86850 RBC ANTIBODY SCREEN: CPT

## 2025-04-25 PROCEDURE — 59050 FETAL MONITOR W/REPORT: CPT

## 2025-04-25 PROCEDURE — 85025 COMPLETE CBC W/AUTO DIFF WBC: CPT

## 2025-04-25 PROCEDURE — 85018 HEMOGLOBIN: CPT

## 2025-04-25 PROCEDURE — 85014 HEMATOCRIT: CPT

## 2025-04-25 PROCEDURE — 90707 MMR VACCINE SC: CPT

## 2025-04-25 PROCEDURE — 36415 COLL VENOUS BLD VENIPUNCTURE: CPT

## 2025-04-25 PROCEDURE — 86901 BLOOD TYPING SEROLOGIC RH(D): CPT

## 2025-04-25 PROCEDURE — 76815 OB US LIMITED FETUS(S): CPT

## 2025-04-25 PROCEDURE — 86900 BLOOD TYPING SEROLOGIC ABO: CPT

## 2025-04-25 PROCEDURE — 86780 TREPONEMA PALLIDUM: CPT

## 2025-04-25 RX ORDER — ACETAMINOPHEN 500 MG/5ML
3 LIQUID (ML) ORAL
Qty: 60 | Refills: 0
Start: 2025-04-25 | End: 2025-04-29

## 2025-04-25 RX ORDER — MEASLES,MUMPS,RUBELLA LIVE VAC 20000/0.5
0.5 VIAL (EA) SUBCUTANEOUS ONCE
Refills: 0 | Status: DISCONTINUED | OUTPATIENT
Start: 2025-04-25 | End: 2025-04-25

## 2025-04-25 RX ORDER — MEASLES,MUMPS,RUBELLA LIVE VAC 20000/0.5
0.5 VIAL (EA) SUBCUTANEOUS ONCE
Refills: 0 | Status: COMPLETED | OUTPATIENT
Start: 2025-04-25 | End: 2025-04-25

## 2025-04-25 RX ORDER — IBUPROFEN 200 MG
1 TABLET ORAL
Qty: 20 | Refills: 0
Start: 2025-04-25 | End: 2025-04-29

## 2025-04-25 RX ADMIN — Medication 0.2 MILLIGRAM(S): at 09:26

## 2025-04-25 RX ADMIN — Medication 600 MILLIGRAM(S): at 06:13

## 2025-04-25 RX ADMIN — Medication 975 MILLIGRAM(S): at 02:34

## 2025-04-25 RX ADMIN — Medication 975 MILLIGRAM(S): at 09:26

## 2025-04-25 RX ADMIN — Medication 3 MILLILITER(S): at 15:00

## 2025-04-25 RX ADMIN — Medication 600 MILLIGRAM(S): at 17:25

## 2025-04-25 RX ADMIN — Medication 0.2 MILLIGRAM(S): at 02:33

## 2025-04-25 RX ADMIN — Medication 75 MICROGRAM(S): at 06:13

## 2025-04-25 RX ADMIN — Medication 3 MILLILITER(S): at 06:21

## 2025-04-25 RX ADMIN — Medication 0.2 MILLIGRAM(S): at 17:26

## 2025-04-25 RX ADMIN — Medication 1 TABLET(S): at 12:29

## 2025-04-25 RX ADMIN — Medication 0.5 MILLILITER(S): at 17:24

## 2025-04-25 RX ADMIN — Medication 0.2 MILLIGRAM(S): at 15:00

## 2025-04-25 RX ADMIN — Medication 0.2 MILLIGRAM(S): at 06:13

## 2025-04-25 RX ADMIN — Medication 600 MILLIGRAM(S): at 12:29

## 2025-04-25 RX ADMIN — Medication 600 MILLIGRAM(S): at 00:04

## 2025-04-25 RX ADMIN — Medication 975 MILLIGRAM(S): at 15:00

## 2025-04-25 NOTE — PROGRESS NOTE ADULT - SUBJECTIVE AND OBJECTIVE BOX
MALINDA COBB is a 34y  now PPD# 1 s/p  at 39w1d EGA c/b midline episiotomy s/p rectal cyto, IM methergine and on methergine series.     Subjective:    No acute events overnight.  Patient has no complaints.  Pain is well controlled.  +flatus, + voiding.  Ambulating and tolerating PO.  Appropriate lochia.  Denies fever, chills, nausea, and vomiting.  She denies lightheadedness, dizziness, HA, blurry vision, palpitations, chest pain and SOB.     Objective:    T(C): 36.9 (25 @ 04:00), Max: 36.9 (25 @ 04:00)  HR: 67 (25 @ 04:00) (59 - 91)  BP: 107/65 (25 @ 04:00) (91/52 - 132/69)  RR: 18 (25 @ 04:00) (15 - 18)  SpO2: 95% (25 @ 04:00) (88% - 100%)    Physical exam:  General: AOx3, NAD.  Abdomen: Soft, appropriately tender to palpitation, fundus firm.  Vaginal: expectant lochia  Ext: No DVT signs, warm extremities.                          11.9   x     )-----------( x        ( 2025 06:35 )             35.6          MALINDA COBB is a 34y  now PPD# 1 s/p  at 39w1d EGA s/p rectal cyto, IM methergine and on methergine series.     Subjective:    No acute events overnight.  Patient has no complaints.  Pain is well controlled.  +flatus, + voiding.  Ambulating and tolerating PO.  Appropriate lochia.  Denies fever, chills, nausea, and vomiting.  She denies lightheadedness, dizziness, HA, blurry vision, palpitations, chest pain and SOB.     Objective:    T(C): 36.9 (25 @ 04:00), Max: 36.9 (25 @ 04:00)  HR: 67 (25 @ 04:00) (59 - 91)  BP: 107/65 (25 @ 04:00) (91/52 - 132/69)  RR: 18 (25 @ 04:00) (15 - 18)  SpO2: 95% (25 @ 04:00) (88% - 100%)    Physical exam:  General: AOx3, NAD.  Abdomen: Soft, appropriately tender to palpitation, fundus firm.  Vaginal: expectant lochia  Ext: No DVT signs, warm extremities.                          11.9   x     )-----------( x        ( 2025 06:35 )             35.6

## 2025-04-25 NOTE — PROGRESS NOTE ADULT - ASSESSMENT
Assessment/Plan: 34y  now PPD# 1 s/p  at 39w1d EGA c/b midline episiotomy s/p rectal cyto, IM methergine and on methergine series.     #Routine post partum care  - Stable, doing well postpartum  - Hgb 12.3 > 11.9  - Pain: well controlled on PO pain meds  - GI: regular diet, normal bowel function  - : voiding, lochia decreasing  - DVT ppx: SCDs, ambulation encouraged  - Healthy baby F    Dispo: Patient to be discharged when meeting all postpartum milestones and pending attending approval. Assessment/Plan: 34y  now PPD# 1 s/p  at 39w1d EGA s/p rectal cyto, IM methergine and on methergine series.     #Routine post partum care  - Stable, doing well postpartum  - Hgb 12.3 > 11.9  - Pain: well controlled on PO pain meds  - GI: regular diet, normal bowel function  - : voiding, lochia decreasing  - DVT ppx: SCDs, ambulation encouraged  - Healthy baby F    Dispo: Discharge home    Addendum:    Subjective Hx, Physical Exam, & Laboratory results reviewed and Pt seen and examined at bedside.  I agree with the Resident Physician's assessment and plan of care, as discussed above.  She was given the opportunity to ask questions and all were addressed.    Ankit Pimentel, DO

## 2025-04-25 NOTE — PROGRESS NOTE ADULT - SUBJECTIVE AND OBJECTIVE BOX
34y Female s/p labor epidural on 04/24/2025    Vital Signs     T(C): 36.7 (25 Apr 2025 08:00), Max: 36.9 (25 Apr 2025 04:00)  T(F): 98 (25 Apr 2025 08:00), Max: 98.4 (25 Apr 2025 04:00)  HR: 63 (25 Apr 2025 08:00) (59 - 91)  BP: 97/65 (25 Apr 2025 08:00) (91/52 - 132/69)  BP(mean): --  RR: 16 (25 Apr 2025 08:00) (15 - 18)  SpO2: 96% (25 Apr 2025 08:00) (88% - 100%)    Parameters below as of 25 Apr 2025 08:00  Patient On (Oxygen Delivery Method): room air            Patient's overall anesthesia satisfaction: Positive    Patient seen and doing well     No headache      No residual numbness or weakness, sensory and motor function intact    No anesthetic complications or complaints noted or reported

## 2025-04-29 ENCOUNTER — APPOINTMENT (OUTPATIENT)
Dept: OBGYN | Facility: CLINIC | Age: 35
End: 2025-04-29

## 2025-06-03 ENCOUNTER — APPOINTMENT (OUTPATIENT)
Dept: OBGYN | Facility: CLINIC | Age: 35
End: 2025-06-03
Payer: COMMERCIAL

## 2025-06-03 VITALS
HEIGHT: 67 IN | WEIGHT: 163 LBS | SYSTOLIC BLOOD PRESSURE: 115 MMHG | DIASTOLIC BLOOD PRESSURE: 74 MMHG | BODY MASS INDEX: 25.58 KG/M2

## 2025-06-03 DIAGNOSIS — O99.280 ENDOCRINE, NUTRITIONAL AND METABOLIC DISEASES COMPLICATING PREGNANCY, UNSPECIFIED TRIMESTER: ICD-10-CM

## 2025-06-03 DIAGNOSIS — O35.2XX0 MATERNAL CARE FOR (SUSPECTED) HEREDITARY DISEASE IN FETUS, NOT APPLICABLE OR UNSPECIFIED: ICD-10-CM

## 2025-06-03 DIAGNOSIS — Z34.93 ENCOUNTER FOR SUPERVISION OF NORMAL PREGNANCY, UNSPECIFIED, THIRD TRIMESTER: ICD-10-CM

## 2025-06-03 DIAGNOSIS — Z98.890 MATERNAL CARE FOR OTHER ABNORMALITIES OF CERVIX, UNSPECIFIED TRIMESTER: ICD-10-CM

## 2025-06-03 DIAGNOSIS — O99.810 ABNORMAL GLUCOSE COMPLICATING PREGNANCY: ICD-10-CM

## 2025-06-03 DIAGNOSIS — O34.40 MATERNAL CARE FOR OTHER ABNORMALITIES OF CERVIX, UNSPECIFIED TRIMESTER: ICD-10-CM

## 2025-06-03 DIAGNOSIS — E03.9 ENDOCRINE, NUTRITIONAL AND METABOLIC DISEASES COMPLICATING PREGNANCY, UNSPECIFIED TRIMESTER: ICD-10-CM

## 2025-06-03 DIAGNOSIS — O21.9 VOMITING OF PREGNANCY, UNSPECIFIED: ICD-10-CM

## 2025-06-03 DIAGNOSIS — O35.9XX0 MATERNAL CARE FOR (SUSPECTED) FETAL ABNORMALITY AND DAMAGE, UNSPECIFIED, NOT APPLICABLE OR UNSPECIFIED: ICD-10-CM

## 2025-06-03 PROCEDURE — 0503F POSTPARTUM CARE VISIT: CPT

## 2025-06-20 ENCOUNTER — OFFICE (OUTPATIENT)
Dept: URBAN - METROPOLITAN AREA CLINIC 104 | Facility: CLINIC | Age: 35
Setting detail: OPHTHALMOLOGY
End: 2025-06-20
Payer: COMMERCIAL

## 2025-06-20 DIAGNOSIS — H52.13: ICD-10-CM

## 2025-06-20 DIAGNOSIS — H35.372: ICD-10-CM

## 2025-06-20 PROCEDURE — 92015 DETERMINE REFRACTIVE STATE: CPT | Performed by: OPHTHALMOLOGY

## 2025-06-20 PROCEDURE — 92134 CPTRZ OPH DX IMG PST SGM RTA: CPT | Performed by: OPHTHALMOLOGY

## 2025-06-20 PROCEDURE — 92014 COMPRE OPH EXAM EST PT 1/>: CPT | Performed by: OPHTHALMOLOGY

## 2025-06-20 ASSESSMENT — CONFRONTATIONAL VISUAL FIELD TEST (CVF)
OS_FINDINGS: FULL
OD_FINDINGS: FULL

## 2025-06-20 ASSESSMENT — REFRACTION_AUTOREFRACTION
OD_CYLINDER: -1.75
OS_CYLINDER: -1.75
OS_AXIS: 178
OS_SPHERE: -3.50
OD_SPHERE: -3.25
OD_AXIS: 002

## 2025-06-20 ASSESSMENT — KERATOMETRY
OD_K1POWER_DIOPTERS: 42.45
OS_K2POWER_DIOPTERS: 45.12
OS_K1POWER_DIOPTERS: 42.83
OD_K2POWER_DIOPTERS: 44.58
OD_AXISANGLE_DEGREES: 091
OS_AXISANGLE_DEGREES: 085

## 2025-06-20 ASSESSMENT — REFRACTION_CURRENTRX
OD_SPHERE: -4.00
OS_CYLINDER: -0.75
OD_CYLINDER: -0.75
OD_VPRISM_DIRECTION: SV
OS_AXIS: 164
OD_AXIS: 4
OS_VPRISM_DIRECTION: SV
OD_OVR_VA: 20/
OS_SPHERE: -4.25
OS_OVR_VA: 20/

## 2025-06-20 ASSESSMENT — VISUAL ACUITY
OD_BCVA: 20/20-
OS_BCVA: 20/20

## 2025-06-20 ASSESSMENT — REFRACTION_MANIFEST
OD_AXIS: 000
OD_CYLINDER: -1.25
OS_CYLINDER: -1.00
OS_VA1: 20/20
OU_VA: 20/20
OS_AXIS: 000
OS_SPHERE: -3.50
OD_VA1: 20/20
OD_SPHERE: -3.75

## 2025-07-16 ENCOUNTER — NON-APPOINTMENT (OUTPATIENT)
Age: 35
End: 2025-07-16

## 2025-08-28 ENCOUNTER — APPOINTMENT (OUTPATIENT)
Dept: DERMATOLOGY | Facility: CLINIC | Age: 35
End: 2025-08-28
Payer: COMMERCIAL

## 2025-08-28 PROCEDURE — 99213 OFFICE O/P EST LOW 20 MIN: CPT

## 2025-09-03 ENCOUNTER — APPOINTMENT (OUTPATIENT)
Dept: OBGYN | Facility: CLINIC | Age: 35
End: 2025-09-03
Payer: COMMERCIAL

## 2025-09-03 VITALS
SYSTOLIC BLOOD PRESSURE: 114 MMHG | HEIGHT: 67 IN | DIASTOLIC BLOOD PRESSURE: 77 MMHG | BODY MASS INDEX: 24.96 KG/M2 | WEIGHT: 159 LBS

## 2025-09-03 DIAGNOSIS — Z01.419 ENCOUNTER FOR GYNECOLOGICAL EXAMINATION (GENERAL) (ROUTINE) W/OUT ABNORMAL FINDINGS: ICD-10-CM

## 2025-09-03 DIAGNOSIS — N64.4 MASTODYNIA: ICD-10-CM

## 2025-09-03 PROCEDURE — 99395 PREV VISIT EST AGE 18-39: CPT

## 2025-09-10 LAB — HPV HIGH+LOW RISK DNA PNL CVX: NOT DETECTED

## (undated) DEVICE — SUCTION YANKAUER TAPERED BULBOUS NO VENT

## (undated) DEVICE — TUBING CONNECTING 6MM 20FT

## (undated) DEVICE — PROCTOSCOPIC SWABSTICK 16"

## (undated) DEVICE — VENODYNE/SCD SLEEVE CALF MEDIUM

## (undated) DEVICE — ELCTR BALL LEEP 5MM

## (undated) DEVICE — SUT VICRYL 0 27" CT-1

## (undated) DEVICE — SOL BAG NS 0.9% 1000ML

## (undated) DEVICE — DRAPE TOWEL BLUE 17" X 24"

## (undated) DEVICE — SUT SILK 3-0 18" FS-1

## (undated) DEVICE — LAP PAD W RING 18 X 18"

## (undated) DEVICE — WARMING BLANKET UPPER ADULT

## (undated) DEVICE — SSH-ESU BOVIE MACHINE T7E14835DX: Type: DURABLE MEDICAL EQUIPMENT

## (undated) DEVICE — DRAPE LIGHT HANDLE COVER (GREEN)

## (undated) DEVICE — DRSG TELFA 3 X 4

## (undated) DEVICE — PACK LITHOTOMY

## (undated) DEVICE — MARKING PEN W RULER / LABELS